# Patient Record
Sex: FEMALE | Race: WHITE | Employment: OTHER | URBAN - METROPOLITAN AREA
[De-identification: names, ages, dates, MRNs, and addresses within clinical notes are randomized per-mention and may not be internally consistent; named-entity substitution may affect disease eponyms.]

---

## 2019-11-18 ENCOUNTER — HOSPITAL ENCOUNTER (OUTPATIENT)
Dept: MRI IMAGING | Age: 59
Discharge: HOME | End: 2019-11-18
Payer: MEDICAID

## 2019-11-18 DIAGNOSIS — M17.9 OSTEOARTHRITIS OF KNEE, UNSPECIFIED: ICD-10-CM

## 2019-11-18 PROCEDURE — 73721 MRI JNT OF LWR EXTRE W/O DYE: CPT

## 2019-12-16 PROBLEM — N18.30 CKD (CHRONIC KIDNEY DISEASE), STAGE III (HCC): Status: ACTIVE | Noted: 2019-12-16

## 2019-12-16 PROBLEM — E03.9 HYPOTHYROIDISM: Status: ACTIVE | Noted: 2019-12-16

## 2019-12-16 PROBLEM — I10 HTN (HYPERTENSION): Status: ACTIVE | Noted: 2019-12-16

## 2019-12-16 PROBLEM — M19.90 OA (OSTEOARTHRITIS): Status: ACTIVE | Noted: 2019-12-16

## 2020-01-16 ENCOUNTER — HOSPITAL ENCOUNTER (OUTPATIENT)
Dept: WOMENS IMAGING | Age: 60
Discharge: HOME | End: 2020-01-16
Payer: MEDICAID

## 2020-01-16 PROCEDURE — 77063 BREAST TOMOSYNTHESIS BI: CPT

## 2020-03-09 ENCOUNTER — APPOINTMENT (OUTPATIENT)
Dept: CT IMAGING | Age: 60
End: 2020-03-09
Payer: MEDICAID

## 2020-03-09 ENCOUNTER — HOSPITAL ENCOUNTER (EMERGENCY)
Age: 60
Discharge: HOME | End: 2020-03-09
Attending: EMERGENCY MEDICINE
Payer: MEDICAID

## 2020-03-09 VITALS
OXYGEN SATURATION: 100 % | SYSTOLIC BLOOD PRESSURE: 127 MMHG | TEMPERATURE: 97.3 F | RESPIRATION RATE: 16 BRPM | DIASTOLIC BLOOD PRESSURE: 73 MMHG | HEART RATE: 73 BPM

## 2020-03-09 LAB
ALBUMIN SERPL-MCNC: 3.8 GM/DL (ref 3.4–5)
ALP BLD-CCNC: 61 IU/L (ref 40–129)
ALT SERPL-CCNC: 19 U/L (ref 10–40)
ANION GAP SERPL CALCULATED.3IONS-SCNC: 11 MMOL/L (ref 4–16)
AST SERPL-CCNC: 22 IU/L (ref 15–37)
BASOPHILS ABSOLUTE: 0 K/CU MM
BASOPHILS RELATIVE PERCENT: 0.3 % (ref 0–1)
BILIRUB SERPL-MCNC: 0.3 MG/DL (ref 0–1)
BUN BLDV-MCNC: 8 MG/DL (ref 6–23)
CALCIUM SERPL-MCNC: 8.5 MG/DL (ref 8.3–10.6)
CHLORIDE BLD-SCNC: 102 MMOL/L (ref 99–110)
CO2: 21 MMOL/L (ref 21–32)
CREAT SERPL-MCNC: 1 MG/DL (ref 0.6–1.1)
DIFFERENTIAL TYPE: ABNORMAL
EOSINOPHILS ABSOLUTE: 0.1 K/CU MM
EOSINOPHILS RELATIVE PERCENT: 1.4 % (ref 0–3)
GFR AFRICAN AMERICAN: >60 ML/MIN/1.73M2
GFR NON-AFRICAN AMERICAN: 57 ML/MIN/1.73M2
GLUCOSE BLD-MCNC: 84 MG/DL (ref 70–99)
HCT VFR BLD CALC: 37 % (ref 37–47)
HCT VFR BLD CALC: 42.2 % (ref 37–47)
HEMOGLOBIN: 11.9 GM/DL (ref 12.5–16)
HEMOGLOBIN: 12.7 GM/DL (ref 12.5–16)
IMMATURE NEUTROPHIL %: 0.4 % (ref 0–0.43)
INR BLD: 1.01 INDEX
LYMPHOCYTES ABSOLUTE: 2.4 K/CU MM
LYMPHOCYTES RELATIVE PERCENT: 30.2 % (ref 24–44)
MCH RBC QN AUTO: 29.8 PG (ref 27–31)
MCHC RBC AUTO-ENTMCNC: 32.2 % (ref 32–36)
MCV RBC AUTO: 92.7 FL (ref 78–100)
MONOCYTES ABSOLUTE: 0.8 K/CU MM
MONOCYTES RELATIVE PERCENT: 10 % (ref 0–4)
NUCLEATED RBC %: 0 %
PDW BLD-RTO: 13.2 % (ref 11.7–14.9)
PLATELET # BLD: 203 K/CU MM (ref 140–440)
PMV BLD AUTO: 9.7 FL (ref 7.5–11.1)
POTASSIUM SERPL-SCNC: 3.7 MMOL/L (ref 3.5–5.1)
PROTHROMBIN TIME: 12.2 SECONDS (ref 11.7–14.5)
RBC # BLD: 3.99 M/CU MM (ref 4.2–5.4)
SEGMENTED NEUTROPHILS ABSOLUTE COUNT: 4.6 K/CU MM
SEGMENTED NEUTROPHILS RELATIVE PERCENT: 57.7 % (ref 36–66)
SODIUM BLD-SCNC: 134 MMOL/L (ref 135–145)
TOTAL IMMATURE NEUTOROPHIL: 0.03 K/CU MM
TOTAL NUCLEATED RBC: 0 K/CU MM
TOTAL PROTEIN: 6.3 GM/DL (ref 6.4–8.2)
WBC # BLD: 7.9 K/CU MM (ref 4–10.5)

## 2020-03-09 PROCEDURE — 36415 COLL VENOUS BLD VENIPUNCTURE: CPT

## 2020-03-09 PROCEDURE — 85025 COMPLETE CBC W/AUTO DIFF WBC: CPT

## 2020-03-09 PROCEDURE — 70450 CT HEAD/BRAIN W/O DYE: CPT

## 2020-03-09 PROCEDURE — 93005 ELECTROCARDIOGRAM TRACING: CPT | Performed by: EMERGENCY MEDICINE

## 2020-03-09 PROCEDURE — 85018 HEMOGLOBIN: CPT

## 2020-03-09 PROCEDURE — 85610 PROTHROMBIN TIME: CPT

## 2020-03-09 PROCEDURE — 85014 HEMATOCRIT: CPT

## 2020-03-09 PROCEDURE — 80053 COMPREHEN METABOLIC PANEL: CPT

## 2020-03-09 PROCEDURE — 99284 EMERGENCY DEPT VISIT MOD MDM: CPT

## 2020-03-09 PROCEDURE — 93010 ELECTROCARDIOGRAM REPORT: CPT | Performed by: INTERNAL MEDICINE

## 2020-03-09 NOTE — ED PROVIDER NOTES
eMERGENCY dEPARTMENT eNCOUnter      PCP: LISA Gustafson NP    CHIEF COMPLAINT    Chief Complaint   Patient presents with    Vaginal Bleeding    Fatigue       HPI    Mo Galdamez is a 64 y.o. female who presents with vaginal bleeding. She states that intercourse last night and then at 3 AM with bleeding. She states the bleeding has been heavier than normal.  She has had clots. Reports generalized weakness, feeling lightheaded and fatigue. She denies focal weakness. She denies coagulants. She denies abdominal pain. REVIEW OF SYSTEMS    Constitutional:  Denies fever, chills. HENT:  Denies sore throat or ear pain   Cardiovascular:  Denies chest pain, palpitations   Respiratory:  Denies cough or shortness of breath    GI:  Denies abdominal pain, nausea, vomiting, or diarrhea  :  Denies any urinary symptoms, flank pain  Musculoskeletal:  Denies back pain, extremity pain  Skin:  Denies rash, color change  Neurologic:  Denies headache, focal weakness or sensory changes   Lymphatic:  Denies swollen glands, edema    All other review of systems are negative  See HPI and nursing notes for additional information     PAST MEDICAL AND SURGICAL HISTORY    Past Medical History:   Diagnosis Date    Arthritis     osteoarthritis    Degenerative disc disease     Fibromyalgia     Hyperlipidemia     Hypertension     Hypothyroidism 12/16/2019     Past Surgical History:   Procedure Laterality Date    BREAST BIOPSY  2000    BREAST SURGERY      biopsy    HEMORRHOID SURGERY      TUBAL LIGATION         CURRENT MEDICATIONS    Current Outpatient Rx   Medication Sig Dispense Refill    busPIRone (BUSPAR) 7.5 MG tablet Take 15 mg by mouth 3 times daily       levothyroxine (SYNTHROID) 50 MCG tablet Take 50 mcg by mouth Daily      gabapentin (NEURONTIN) 400 MG capsule Take 600 mg by mouth 3 times daily.        Cholecalciferol (VITAMIN D3) 5000 UNITS TABS Take by mouth      ropinirole (REQUIP) 0.5 MG tablet Take 0.5 mg by mouth once.  milnacipran HCl (SAVELLA) 25 MG TABS Take 25 mg by mouth 2 times daily.  trazodone (DESYREL) 50 MG tablet Take 50 mg by mouth 2 times daily.  enalapril (VASOTEC) 2.5 MG tablet Take 5 mg by mouth 2 times daily       lorazepam (ATIVAN) 1 MG tablet Take 1 mg by mouth 2 times daily.  simvastatin (ZOCOR) 20 MG tablet Take 20 mg by mouth nightly.  sertraline (ZOLOFT) 50 MG tablet Take 100 mg by mouth daily       tizanidine (ZANAFLEX) 2 MG capsule Take 4 mg by mouth 3 times daily          ALLERGIES    No Known Allergies    SOCIAL AND FAMILY HISTORY    Social History     Socioeconomic History    Marital status: Single     Spouse name: None    Number of children: None    Years of education: None    Highest education level: None   Occupational History    None   Social Needs    Financial resource strain: None    Food insecurity     Worry: None     Inability: None    Transportation needs     Medical: None     Non-medical: None   Tobacco Use    Smoking status: Never Smoker    Smokeless tobacco: Never Used   Substance and Sexual Activity    Alcohol use:  Yes    Drug use: No    Sexual activity: None   Lifestyle    Physical activity     Days per week: None     Minutes per session: None    Stress: None   Relationships    Social connections     Talks on phone: None     Gets together: None     Attends Anglican service: None     Active member of club or organization: None     Attends meetings of clubs or organizations: None     Relationship status: None    Intimate partner violence     Fear of current or ex partner: None     Emotionally abused: None     Physically abused: None     Forced sexual activity: None   Other Topics Concern    None   Social History Narrative    None     Family History   Problem Relation Age of Onset    Heart Disease Mother     High Blood Pressure Mother     Diabetes Father     Heart Disease Brother     Diabetes Brother    [facility] High Blood Pressure Brother          PHYSICAL EXAM    VITAL SIGNS: There were no vitals taken for this visit. Constitutional:  Well developed, No acute distress. HENT:  Normocephalic, Atraumatic, PERRL. EOMI. Sclera clear. Conjunctiva normal.  Neck: supple without ridigity  Cardiovascular:  Regular rate and rhythm, No murmurs  Respiratory:  Nonlabored breathing. Normal breath sounds, No wheezing  Abdomen: Soft, Non tender, bowel sounds present. : Pelvic exam performed with chaperone, nurse Jane Nielsen present. 2 lacerations noted 1 at the 12 o'clock position, another at the 6 o'clock position. The one at the 12 o'clock position is approximately 1-1/2 cm, superficial, not gaping, but is actively slightly bleeding. The other one is punctate, less than a half a centimeter. Appears more abrased than gapping laceraations. Musculoskeletal: No edema, No tenderness  Integument:  Warm, Dry, no rash  Neurologic:  Alert & oriented. Vision is intact. Pupils are equal, round and reactive to light bilaterally, EOM are intact, face is symmetrical, tongue is midline. Motor function and sensation are grossly intact. There is no pronator drift. Finger to nose is normal. Lower extremity reflexes are +2. No trunchal ataxia. Speech is normal.   Psychiatric:  Affect normal, Mood normal.       Labs:  Labs Reviewed   CBC WITH AUTO DIFFERENTIAL - Abnormal; Notable for the following components:       Result Value    RBC 3.99 (*)     Hemoglobin 11.9 (*)     Monocytes % 10.0 (*)     All other components within normal limits   COMPREHENSIVE METABOLIC PANEL - Abnormal; Notable for the following components:    Sodium 134 (*)     Total Protein 6.3 (*)     GFR Non- 57 (*)     All other components within normal limits   PROTIME-INR   HEMOGLOBIN AND HEMATOCRIT, BLOOD         EKG    This EKG was interpreted by me. Rate is 64, rhythm is sinus. NC and QT intervals are within normal limits.  There is no ST segment or T wave changes. This EKG was compared to previous EKG from date 8/17/18. Peers similar to previous EKG. RADIOLOGY    Ct Head Wo Contrast    Result Date: 3/9/2020  EXAMINATION: CT OF THE HEAD WITHOUT CONTRAST  3/9/2020 10:17 am TECHNIQUE: CT of the head was performed without the administration of intravenous contrast. Dose modulation, iterative reconstruction, and/or weight based adjustment of the mA/kV was utilized to reduce the radiation dose to as low as reasonably achievable. COMPARISON: None. HISTORY: ORDERING SYSTEM PROVIDED HISTORY: weakness TECHNOLOGIST PROVIDED HISTORY: Reason for exam:->weakness Has a \"code stroke\" or \"stroke alert\" been called? ->No Reason for Exam: weakness Acuity: Acute Type of Exam: Initial FINDINGS: BRAIN/VENTRICLES: The cerebral and cerebellar parenchyma demonstrate normal volume without areas of hemorrhage, mass, or midline shift. No abnormal extra-axial fluid collections. The ventricles are normal in size. Gray-white differentiation is maintained without evidence of an acute infarct. ORBITS: The visualized portion of the orbits demonstrate no acute abnormality. SINUSES: The visualized paranasal sinuses and mastoid air cells demonstrate no acute abnormality. SOFT TISSUES/SKULL:  No acute abnormality of the visualized skull or soft tissues. No acute intracranial abnormality. ED COURSE & MEDICAL DECISION MAKING       Vital signs and nursing notes reviewed during ED course. All pertinent Lab data and radiographic results reviewed with patient at bedside. The patient and/or the family were informed of the results of any tests/labs/imaging, the treatment plan, and time was allotted to answer questions. This is a 72-year-old female who presented with complaints of vaginal bleeding. Medics were concerned about abnormal speech and possibly facial droop.   On her arrival I did not notice any facial droop, there were no focal findings on exam.  She had no speech difficulties. Her only complaint was vaginal bleeding and feeling lightheaded. I have low suspicion for CVA without any focal symptoms. I did call off the stroke alert as I did not feel this was likely to be stroke. Exam was performed which did show to vaginal lacerations. These were not deep enough to need suturing, and leading was subsiding on its own. Labs did show hemoglobin to be 11.9. After several hours of observation and bleeding subsiding on its own hemoglobin was 12.7. She was given IV fluids initially. Given that the bleeding has improved, hemoglobin is stable, vital signs reassuring I do feel that discharge is reasonable but with outpatient follow-up with her GYN. She is instructed on pelvic rest, bleeding precautions. She is instructed return to the emergency department with any new or worsening signs or symptoms. She voiced understanding of the discharge directions and the return precautions.       Clinical  IMPRESSION    Vaginal bleeding, vaginal laceration          (Please note the MDM and HPI sections of this note were completed with a voice recognition program.  Efforts were made to edit the dictations but occasionally words are mis-transcribed.)      Silvia Emerson DO  03/09/20 8941

## 2020-03-09 NOTE — ED NOTES
Assessed external vaginal area for bleeding there is bright red blood noted on the inner thighs of the patient legs however, at this time I did not see excesive bleeding.       Miley Wyatt RN  03/09/20 1445

## 2020-03-13 LAB
EKG ATRIAL RATE: 64 BPM
EKG DIAGNOSIS: NORMAL
EKG P AXIS: -3 DEGREES
EKG P-R INTERVAL: 148 MS
EKG Q-T INTERVAL: 468 MS
EKG QRS DURATION: 88 MS
EKG QTC CALCULATION (BAZETT): 482 MS
EKG R AXIS: 61 DEGREES
EKG T AXIS: 31 DEGREES
EKG VENTRICULAR RATE: 64 BPM

## 2021-01-03 ENCOUNTER — APPOINTMENT (OUTPATIENT)
Dept: GENERAL RADIOLOGY | Age: 61
End: 2021-01-03
Payer: MEDICAID

## 2021-01-03 ENCOUNTER — HOSPITAL ENCOUNTER (EMERGENCY)
Age: 61
Discharge: HOME | End: 2021-01-03
Payer: MEDICAID

## 2021-01-03 VITALS
OXYGEN SATURATION: 98 % | HEIGHT: 64 IN | BODY MASS INDEX: 36.54 KG/M2 | RESPIRATION RATE: 18 BRPM | DIASTOLIC BLOOD PRESSURE: 84 MMHG | TEMPERATURE: 98.5 F | HEART RATE: 71 BPM | WEIGHT: 214 LBS | SYSTOLIC BLOOD PRESSURE: 113 MMHG

## 2021-01-03 DIAGNOSIS — M25.562 ACUTE PAIN OF LEFT KNEE: Primary | ICD-10-CM

## 2021-01-03 DIAGNOSIS — M54.50 ACUTE LOW BACK PAIN, UNSPECIFIED BACK PAIN LATERALITY, UNSPECIFIED WHETHER SCIATICA PRESENT: ICD-10-CM

## 2021-01-03 PROCEDURE — 99285 EMERGENCY DEPT VISIT HI MDM: CPT

## 2021-01-03 PROCEDURE — 72100 X-RAY EXAM L-S SPINE 2/3 VWS: CPT

## 2021-01-03 PROCEDURE — 72170 X-RAY EXAM OF PELVIS: CPT

## 2021-01-03 PROCEDURE — 73564 X-RAY EXAM KNEE 4 OR MORE: CPT

## 2021-01-03 PROCEDURE — 96372 THER/PROPH/DIAG INJ SC/IM: CPT

## 2021-01-03 PROCEDURE — 6370000000 HC RX 637 (ALT 250 FOR IP): Performed by: PHYSICIAN ASSISTANT

## 2021-01-03 PROCEDURE — 6360000002 HC RX W HCPCS: Performed by: PHYSICIAN ASSISTANT

## 2021-01-03 RX ORDER — KETOROLAC TROMETHAMINE 30 MG/ML
30 INJECTION, SOLUTION INTRAMUSCULAR; INTRAVENOUS ONCE
Status: COMPLETED | OUTPATIENT
Start: 2021-01-03 | End: 2021-01-03

## 2021-01-03 RX ORDER — HYDROCODONE BITARTRATE AND ACETAMINOPHEN 5; 325 MG/1; MG/1
1 TABLET ORAL ONCE
Status: COMPLETED | OUTPATIENT
Start: 2021-01-03 | End: 2021-01-03

## 2021-01-03 RX ORDER — HYDROCODONE BITARTRATE AND ACETAMINOPHEN 5; 325 MG/1; MG/1
1 TABLET ORAL EVERY 4 HOURS PRN
Qty: 8 TABLET | Refills: 0 | Status: SHIPPED | OUTPATIENT
Start: 2021-01-03 | End: 2021-01-06

## 2021-01-03 RX ORDER — IBUPROFEN 600 MG/1
600 TABLET ORAL EVERY 6 HOURS PRN
Qty: 20 TABLET | Refills: 0 | Status: SHIPPED | OUTPATIENT
Start: 2021-01-03 | End: 2021-05-24

## 2021-01-03 RX ADMIN — HYDROCODONE BITARTRATE AND ACETAMINOPHEN 1 TABLET: 5; 325 TABLET ORAL at 15:04

## 2021-01-03 RX ADMIN — KETOROLAC TROMETHAMINE 30 MG: 30 INJECTION, SOLUTION INTRAMUSCULAR; INTRAVENOUS at 15:05

## 2021-01-03 ASSESSMENT — PAIN DESCRIPTION - ORIENTATION: ORIENTATION: LEFT

## 2021-01-03 ASSESSMENT — PAIN SCALES - GENERAL: PAINLEVEL_OUTOF10: 10

## 2021-01-03 NOTE — ED PROVIDER NOTES
[address] [facility] COMPLAINT    Chief Complaint   Patient presents with    Fall    Knee Pain     left         This patient was not evaluated by the attending physician. I have independently evaluated this patient. HPI    Cielo Aaron is a 64 y.o. female who presents with low back pain, left knee pain. Onset 3 days ago. Context is patient slipped on ice causing her to fall onto her buttock. She notes an exacerbation of her chronic low back pain as well as left knee pain. The pain is generalized to the anterior aspect and does not radiate. Pain is worse with ambulation. Patient states she has chronic knee pain for which she is scheduled for arthroscopic exploratory surgery in the near future. Pain is achy and does not radiate. No other lower extremity injuries. No upper extremity injuries. Back pain is generalized to the low back, worse with certain movements of the torso. No numbness, tingling, weakness. No new nature of symptoms compared to previous chronic back pain. Patient does note she had struck the back of her head. She initially had scalp pain localized to the area of injury. She denies loss of consciousness or neck pain. She denies vomiting, headache, visual changes, confusion. Denies blood or clear fluid from ears, nose, mouth. Pt denies EtOH or illicit drug use. REVIEW OF SYSTEMS    Constitutional:  Denies fever. Neurologic:    See HPI. Denies confusion or memory loss. Denies light-headedness, dizziness. No extremity  sensory changes, or weakness. Eyes:  Denies diplopia, blurred vision, or loss visual field. Denies discharge . Cardiac: No Chest Pain, palpitations, or Chest Injury  Respiratory:  Denies cough, wheezes, shortness of breath, respiratory discomfort   GI: No Abdominal pain or Abdominal Injury  Musculoskeletal:    See HPI. Skin: Abrasions over anterior left knee, otherwise skin intact.   Denies rash   Lymphatic: Denies swollen glands     All other review of systems are negative  See HPI and nursing notes for additional information     PAST MEDICAL AND SURGICAL HISTORY    Past Medical History:   Diagnosis Date    Arthritis     osteoarthritis    Degenerative disc disease     Fibromyalgia     Hyperlipidemia     Hypertension     Hypothyroidism 12/16/2019     Past Surgical History:   Procedure Laterality Date    BREAST BIOPSY  2000    BREAST SURGERY      biopsy    HEMORRHOID SURGERY      HYSTERECTOMY      TUBAL LIGATION         CURRENT MEDICATIONS    Current Outpatient Rx   Medication Sig Dispense Refill    HYDROcodone-acetaminophen (NORCO) 5-325 MG per tablet Take 1 tablet by mouth every 4 hours as needed for Pain for up to 3 days. 8 tablet 0    ibuprofen (ADVIL;MOTRIN) 600 MG tablet Take 1 tablet by mouth every 6 hours as needed for Pain 20 tablet 0    cetirizine (ZYRTEC) 10 MG tablet Take 10 mg by mouth daily      busPIRone (BUSPAR) 7.5 MG tablet Take 30 mg by mouth 2 times daily       levothyroxine (SYNTHROID) 50 MCG tablet Take 50 mcg by mouth Daily      gabapentin (NEURONTIN) 400 MG capsule Take 600 mg by mouth 2 times daily.  Cholecalciferol (VITAMIN D3) 5000 UNITS TABS Take by mouth      ropinirole (REQUIP) 0.5 MG tablet Take 0.5 mg by mouth once.  trazodone (DESYREL) 50 MG tablet Take 100 mg by mouth nightly       enalapril (VASOTEC) 2.5 MG tablet Take 5 mg by mouth 2 times daily       simvastatin (ZOCOR) 20 MG tablet Take 20 mg by mouth nightly.         sertraline (ZOLOFT) 50 MG tablet Take 150 mg by mouth daily       tizanidine (ZANAFLEX) 2 MG capsule Take 4 mg by mouth 2 times daily          ALLERGIES    No Known Allergies    SOCIAL AND FAMILY HISTORY    Social History     Socioeconomic History    Marital status: Single     Spouse name: Not on file    Number of children: Not on file    Years of education: Not on file    Highest education level: Not on file   Occupational History  Not on file   Social Needs    Financial resource strain: Not on file    Food insecurity     Worry: Not on file     Inability: Not on file    Transportation needs     Medical: Not on file     Non-medical: Not on file   Tobacco Use    Smoking status: Never Smoker    Smokeless tobacco: Never Used   Substance and Sexual Activity    Alcohol use: Yes    Drug use: No    Sexual activity: Yes     Partners: Male   Lifestyle    Physical activity     Days per week: Not on file     Minutes per session: Not on file    Stress: Not on file   Relationships    Social connections     Talks on phone: Not on file     Gets together: Not on file     Attends Yarsani service: Not on file     Active member of club or organization: Not on file     Attends meetings of clubs or organizations: Not on file     Relationship status: Not on file    Intimate partner violence     Fear of current or ex partner: Not on file     Emotionally abused: Not on file     Physically abused: Not on file     Forced sexual activity: Not on file   Other Topics Concern    Not on file   Social History Narrative    Not on file     Family History   Problem Relation Age of Onset    Heart Disease Mother     High Blood Pressure Mother     Diabetes Father     Heart Disease Brother     Diabetes Brother     High Blood Pressure Brother          PHYSICAL EXAM    VITAL SIGNS: /84   Pulse 71   Temp 98.5 °F (36.9 °C) (Oral)   Resp 18   Ht 5' 4\" (1.626 m)   Wt 214 lb (97.1 kg)   SpO2 98%   BMI 36.73 kg/m²      Constitutional:  Well developed, well nourished, no acute distress. Scalp:  No swelling, discoloration. Skin intact. No focal palpable defect or tenderness. Neck / back:  No JVD. No swelling or discoloration on inspection. No posterior midline neck tenderness. There is mild generalized right superior trapezius tenderness without redness, warmth, palpable defect. Full range of motion without pain.    No swelling, discoloration, or tenderness/palpable defect of remaining back exam.    HENT:   - PERRL. EOMI. No obvious eyeball trauma, hyphema, hemorrhage, or conjunctival injection. Eyelids intact without obvious trauma. - External auditory canals and TMs clear  - Oral cavity without injury. Dentition intact without obvious injury. Oropharynx clear. No trismus    -Nasal passages clear without blood, clear fluid, mass, septal mass/hematoma. Nose is without obvious deformity, tenderness, or palpable defect. -  Palpation of the remainder of facial bones including orbits, maxilla and mandible reveals no focal tenderness or palpable defect, crepitus. No midface instability. Able to fully open and close jaw without pain or TMJ tenderness.      - No Sanchez sign, no raccoon sign. Cardiovascular:    Reg rate, no murmurs. Inspection of chest wall reveals no discoloration or swelling. There is no focal tenderness or palpable defect. Respiratory:   Nonlabored breathing. Lungs Clear, no retractions   Abdomen:    No discoloration or swelling. Soft, no rigidity, guarding, distention. No tenderness or palpable defect. Musculoskeletal:    Left knee exam: There are several small abrasions over the anterior lateral knee. No obvious joint effusion or joint erythema or warmth. There is generalized tenderness to the anterior knee without palpable defect. Patient has full active range of motion intact although slow in terminal flexion. No valgus, varus, drawer laxity. Able to fully extend against gravity-extensor mechanism intact. Distal pulses, sensation, motor intact. Full range of motion of remaining upper and lower extremities without obvious deficit , pain, tenderness to palpation. Back exam: No swelling or discoloration. There is generalized bilateral paralumbar tenderness without redness or warmth or palpable defect. Lower extremity motor, sensation, deep tendon reflexes intact.     Integument:    Abrasions noted above, otherwise intact      Neurologic:    - Alert & oriented person, place, time, and situation, no speech difficulties or slurring.  - No obvious gross motor deficits  - Cranial nerves 2-12 grossly intact  - Sensation intact to light touch  - Strength 5/5 in upper and lower extremities bilaterally  - Normal finger to nose test bilaterally  - Rapid alternating movements intact  - Normal heel-shin bilaterally  - No pronator drift. - Romberg negative. - Light touch sensation intact throughout. - Upper and lower extremity DTRs 2+ bilaterally. -Gait with mild limp favoring left knee, otherwise steady without coordination deficit. Psych:  Cooperative, no abnormal behavior or hallucinations        Imaging:  XR KNEE LEFT (MIN 4 VIEWS)   Final Result   Negative left knee. XR LUMBAR SPINE (2-3 VIEWS)   Final Result   Multiple mild compression deformities of the lumbar spine appear chronic. If   there is concern for acute fracture, suggest MRI or bone scan for further   evaluation. XR PELVIS (1-2 VIEWS)   Final Result   No acute osseous abnormality of the pelvis. ED COURSE & MEDICAL DECISION MAKING        Patient presents as above history and exam is consistent with a chemical fall with subsequent low back pain flare and contusions, abrasion to left knee. No obvious osseous abnormalities on imaging today. There is note of chronic appearing compression deformities without acute appearance-I reviewed this with patient at bedside. We discussed the possibility of acute compression fracture today and patient will be given pain medication and recommend follow with PCP regarding back pain. Patient is neurologically intact I feel safe for discharge home. Plan for discharge with double Ace wrap on knee and crutches for weightbearing assistance and follow with orthopedist for continued evaluation of ongoing knee pain which is flared from recent injury. .    Pain med Rx provided, double Ace wrap and crutches provided and patient agrees to call orthopedist tomorrow for recheck appointment within the next several days. I discussed Return to emergency Department precautions with patient which include worsening pain or swelling, vision changes, focal neurological symptoms (discussed), or any new symptoms. Clinical  IMPRESSION    1. Acute pain of left knee    2. Acute low back pain, unspecified back pain laterality, unspecified whether sciatica present              Comment: Please note this report has been produced using speech recognition software and may contain errors related to that system including errors in grammar, punctuation, and spelling, as well as words and phrases that may be inappropriate. If there are any questions or concerns please feel free to contact the dictating provider for clarification.                Kp Johnson [place]  01/03/21 6786

## 2021-01-03 NOTE — ED NOTES
Discussed discharge instructions with patient. All questions answered. Patient verbalized understanding. Ace wrap applied to left knee. Patient refused crutches because she thinks she will not be able to use them. She states she has a walker at home and will use that instead. Patient taken out by wheelchair.  will be taking patient home.      Marvin Dowling RN  01/03/21 [id number]

## 2021-01-03 NOTE — ED TRIAGE NOTES
Pt presents to ED by EMS from home for pain in the back and left knee that started on the 31st following a fall. Pt states she did hit her head but denies LOC. Pt is alert and oriented x4. Pt states she has been able to ambulate independently but rates pain 10/10.

## 2021-01-03 NOTE — ED NOTES
Bed: ED-33  Expected date:   Expected time:   Means of arrival:   Comments:  Nehal Mehta  01/03/21 1153

## 2022-03-02 ENCOUNTER — HOSPITAL ENCOUNTER (EMERGENCY)
Age: 62
Discharge: HOME | End: 2022-03-02
Attending: EMERGENCY MEDICINE
Payer: MEDICAID

## 2022-03-02 ENCOUNTER — APPOINTMENT (OUTPATIENT)
Dept: CT IMAGING | Age: 62
End: 2022-03-02
Payer: MEDICAID

## 2022-03-02 VITALS
HEIGHT: 64 IN | SYSTOLIC BLOOD PRESSURE: 137 MMHG | OXYGEN SATURATION: 99 % | TEMPERATURE: 97.9 F | DIASTOLIC BLOOD PRESSURE: 68 MMHG | WEIGHT: 205 LBS | BODY MASS INDEX: 35 KG/M2 | HEART RATE: 87 BPM | RESPIRATION RATE: 16 BRPM

## 2022-03-02 DIAGNOSIS — R51.9 ACUTE NONINTRACTABLE HEADACHE, UNSPECIFIED HEADACHE TYPE: Primary | ICD-10-CM

## 2022-03-02 DIAGNOSIS — R03.0 ELEVATED BLOOD PRESSURE READING: ICD-10-CM

## 2022-03-02 LAB
ALBUMIN SERPL-MCNC: 3.5 GM/DL (ref 3.4–5)
ALP BLD-CCNC: 64 IU/L (ref 40–129)
ALT SERPL-CCNC: 18 U/L (ref 10–40)
ANION GAP SERPL CALCULATED.3IONS-SCNC: 13 MMOL/L (ref 4–16)
APTT: 27.5 SECONDS (ref 25.1–37.1)
AST SERPL-CCNC: 25 IU/L (ref 15–37)
BASOPHILS ABSOLUTE: 0 K/CU MM
BASOPHILS RELATIVE PERCENT: 0.4 % (ref 0–1)
BILIRUB SERPL-MCNC: 0.4 MG/DL (ref 0–1)
BUN BLDV-MCNC: 9 MG/DL (ref 6–23)
CALCIUM SERPL-MCNC: 9 MG/DL (ref 8.3–10.6)
CHLORIDE BLD-SCNC: 104 MMOL/L (ref 99–110)
CO2: 19 MMOL/L (ref 21–32)
CREAT SERPL-MCNC: 1 MG/DL (ref 0.6–1.1)
DIFFERENTIAL TYPE: ABNORMAL
EOSINOPHILS ABSOLUTE: 0.2 K/CU MM
EOSINOPHILS RELATIVE PERCENT: 4.5 % (ref 0–3)
GFR AFRICAN AMERICAN: >60 ML/MIN/1.73M2
GFR NON-AFRICAN AMERICAN: 57 ML/MIN/1.73M2
GLUCOSE BLD-MCNC: 92 MG/DL (ref 70–99)
GLUCOSE, CSF: 47 MG/DL (ref 40–75)
HCT VFR BLD CALC: 44.2 % (ref 37–47)
HEMOGLOBIN: 13.4 GM/DL (ref 12.5–16)
IMMATURE NEUTROPHIL %: 0.2 % (ref 0–0.43)
INR BLD: 0.92 INDEX
LYMPHOCYTES ABSOLUTE: 2.4 K/CU MM
LYMPHOCYTES RELATIVE PERCENT: 46.5 % (ref 24–44)
LYMPHS CSF: 0 %
LYMPHS CSF: 0 %
MCH RBC QN AUTO: 29.7 PG (ref 27–31)
MCHC RBC AUTO-ENTMCNC: 30.3 % (ref 32–36)
MCV RBC AUTO: 98 FL (ref 78–100)
MONOCYTES ABSOLUTE: 0.4 K/CU MM
MONOCYTES RELATIVE PERCENT: 8.7 % (ref 0–4)
NUCLEATED RBC %: 0 %
OTHER CELLS CSF: ABNORMAL CU MM
OTHER CELLS CSF: NORMAL CU MM
OTHER CELLS, CSF: 0 %
OTHER CELLS, CSF: 0 %
PDW BLD-RTO: 12.3 % (ref 11.7–14.9)
PLATELET # BLD: 179 K/CU MM (ref 140–440)
PMV BLD AUTO: 10.1 FL (ref 7.5–11.1)
POTASSIUM SERPL-SCNC: 3.8 MMOL/L (ref 3.5–5.1)
PROTEIN CSF: 41 MG/DL (ref 15–45)
PROTHROMBIN TIME: 11.8 SECONDS (ref 11.7–14.5)
RBC # BLD: 4.51 M/CU MM (ref 4.2–5.4)
RBC CSF: 0 CU MM
RBC CSF: 10 CU MM
SEGMENTED NEUTROPHILS ABSOLUTE COUNT: 2 K/CU MM
SEGMENTED NEUTROPHILS RELATIVE PERCENT: 39.7 % (ref 36–66)
SEGS, CSF: 0 %
SEGS, CSF: 0 %
SODIUM BLD-SCNC: 136 MMOL/L (ref 135–145)
TOTAL IMMATURE NEUTOROPHIL: 0.01 K/CU MM
TOTAL NUCLEATED RBC: 0 K/CU MM
TOTAL PROTEIN: 6.5 GM/DL (ref 6.4–8.2)
TUBE #: ABNORMAL
TUBE #: NORMAL
WBC # BLD: 5.1 K/CU MM (ref 4–10.5)
WBC CSF: 0 CU MM (ref 0–5)
WBC CSF: 2 CU MM (ref 0–5)

## 2022-03-02 PROCEDURE — 6360000004 HC RX CONTRAST MEDICATION: Performed by: EMERGENCY MEDICINE

## 2022-03-02 PROCEDURE — 2500000003 HC RX 250 WO HCPCS: Performed by: EMERGENCY MEDICINE

## 2022-03-02 PROCEDURE — 6360000002 HC RX W HCPCS: Performed by: EMERGENCY MEDICINE

## 2022-03-02 PROCEDURE — 85610 PROTHROMBIN TIME: CPT

## 2022-03-02 PROCEDURE — 82945 GLUCOSE OTHER FLUID: CPT

## 2022-03-02 PROCEDURE — 87070 CULTURE OTHR SPECIMN AEROBIC: CPT

## 2022-03-02 PROCEDURE — 96372 THER/PROPH/DIAG INJ SC/IM: CPT

## 2022-03-02 PROCEDURE — 85730 THROMBOPLASTIN TIME PARTIAL: CPT

## 2022-03-02 PROCEDURE — 80053 COMPREHEN METABOLIC PANEL: CPT

## 2022-03-02 PROCEDURE — 62270 DX LMBR SPI PNXR: CPT

## 2022-03-02 PROCEDURE — 96375 TX/PRO/DX INJ NEW DRUG ADDON: CPT

## 2022-03-02 PROCEDURE — 84157 ASSAY OF PROTEIN OTHER: CPT

## 2022-03-02 PROCEDURE — 93005 ELECTROCARDIOGRAM TRACING: CPT | Performed by: EMERGENCY MEDICINE

## 2022-03-02 PROCEDURE — 85025 COMPLETE CBC W/AUTO DIFF WBC: CPT

## 2022-03-02 PROCEDURE — 89051 BODY FLUID CELL COUNT: CPT

## 2022-03-02 PROCEDURE — 96374 THER/PROPH/DIAG INJ IV PUSH: CPT

## 2022-03-02 PROCEDURE — 99285 EMERGENCY DEPT VISIT HI MDM: CPT

## 2022-03-02 PROCEDURE — 6370000000 HC RX 637 (ALT 250 FOR IP): Performed by: EMERGENCY MEDICINE

## 2022-03-02 PROCEDURE — 87205 SMEAR GRAM STAIN: CPT

## 2022-03-02 PROCEDURE — 70498 CT ANGIOGRAPHY NECK: CPT

## 2022-03-02 PROCEDURE — 70450 CT HEAD/BRAIN W/O DYE: CPT

## 2022-03-02 RX ORDER — HALOPERIDOL 5 MG/ML
5 INJECTION INTRAMUSCULAR ONCE
Status: COMPLETED | OUTPATIENT
Start: 2022-03-02 | End: 2022-03-02

## 2022-03-02 RX ORDER — DIPHENHYDRAMINE HYDROCHLORIDE 50 MG/ML
50 INJECTION INTRAMUSCULAR; INTRAVENOUS ONCE
Status: COMPLETED | OUTPATIENT
Start: 2022-03-02 | End: 2022-03-02

## 2022-03-02 RX ORDER — METOCLOPRAMIDE HYDROCHLORIDE 5 MG/ML
10 INJECTION INTRAMUSCULAR; INTRAVENOUS ONCE
Status: COMPLETED | OUTPATIENT
Start: 2022-03-02 | End: 2022-03-02

## 2022-03-02 RX ORDER — ACETAMINOPHEN 500 MG
1000 TABLET ORAL ONCE
Status: COMPLETED | OUTPATIENT
Start: 2022-03-02 | End: 2022-03-02

## 2022-03-02 RX ORDER — SODIUM CHLORIDE 0.9 % (FLUSH) 0.9 %
10 SYRINGE (ML) INJECTION PRN
Status: DISCONTINUED | OUTPATIENT
Start: 2022-03-02 | End: 2022-03-02 | Stop reason: HOSPADM

## 2022-03-02 RX ORDER — LIDOCAINE HYDROCHLORIDE AND EPINEPHRINE 10; 10 MG/ML; UG/ML
20 INJECTION, SOLUTION INFILTRATION; PERINEURAL ONCE
Status: COMPLETED | OUTPATIENT
Start: 2022-03-02 | End: 2022-03-02

## 2022-03-02 RX ORDER — INDOMETHACIN 50 MG/1
50 CAPSULE ORAL 2 TIMES DAILY PRN
Qty: 12 CAPSULE | Refills: 0 | Status: SHIPPED | OUTPATIENT
Start: 2022-03-02

## 2022-03-02 RX ORDER — ONDANSETRON 4 MG/1
4 TABLET, ORALLY DISINTEGRATING ORAL 3 TIMES DAILY PRN
Qty: 21 TABLET | Refills: 0 | Status: SHIPPED | OUTPATIENT
Start: 2022-03-02

## 2022-03-02 RX ADMIN — HALOPERIDOL LACTATE 5 MG: 5 INJECTION, SOLUTION INTRAMUSCULAR at 16:13

## 2022-03-02 RX ADMIN — IOPAMIDOL 80 ML: 755 INJECTION, SOLUTION INTRAVENOUS at 14:03

## 2022-03-02 RX ADMIN — METOCLOPRAMIDE 10 MG: 5 INJECTION, SOLUTION INTRAMUSCULAR; INTRAVENOUS at 14:52

## 2022-03-02 RX ADMIN — ACETAMINOPHEN 1000 MG: 500 TABLET ORAL at 14:47

## 2022-03-02 RX ADMIN — DIPHENHYDRAMINE HYDROCHLORIDE 50 MG: 50 INJECTION, SOLUTION INTRAMUSCULAR; INTRAVENOUS at 14:49

## 2022-03-02 RX ADMIN — LIDOCAINE HYDROCHLORIDE AND EPINEPHRINE 20 ML: 10; 10 INJECTION, SOLUTION INFILTRATION; PERINEURAL at 14:55

## 2022-03-02 ASSESSMENT — PAIN SCALES - GENERAL
PAINLEVEL_OUTOF10: 3
PAINLEVEL_OUTOF10: 10
PAINLEVEL_OUTOF10: 10
PAINLEVEL_OUTOF10: 2
PAINLEVEL_OUTOF10: 10
PAINLEVEL_OUTOF10: 7
PAINLEVEL_OUTOF10: 8

## 2022-03-02 ASSESSMENT — PAIN DESCRIPTION - PAIN TYPE
TYPE: ACUTE PAIN

## 2022-03-02 ASSESSMENT — PAIN - FUNCTIONAL ASSESSMENT
PAIN_FUNCTIONAL_ASSESSMENT: 0-10

## 2022-03-02 ASSESSMENT — PAIN DESCRIPTION - FREQUENCY: FREQUENCY: CONTINUOUS

## 2022-03-02 ASSESSMENT — PAIN DESCRIPTION - ORIENTATION
ORIENTATION: LEFT
ORIENTATION: POSTERIOR

## 2022-03-02 ASSESSMENT — PAIN DESCRIPTION - LOCATION
LOCATION: HEAD

## 2022-03-02 ASSESSMENT — PAIN SCALES - WONG BAKER: WONGBAKER_NUMERICALRESPONSE: 0

## 2022-03-02 ASSESSMENT — PAIN DESCRIPTION - DESCRIPTORS: DESCRIPTORS: THROBBING

## 2022-03-02 NOTE — ED NOTES
Procedure complete at this time.  Patient states headache feels \"a little better\"        Gemma Barrera RN  03/02/22 3709

## 2022-03-02 NOTE — ED PROVIDER NOTES
Triage Chief Complaint:   Headache (pulsating, excruciating pain in back of head that started 30 minutes ago)    [place]:  Yvon Herrera is a 63 y.o. female that presents with a headache. Patient was in baseline state of health until 30 minutes prior to arrival when she was engaging in some foreplay with her significant other and developed a headache. Headache is posterior head, bilateral, radiating to the front of the head, constant since onset and currently severe. No radiation to the neck. No head trauma. No numbness or weakness. No vision changes. No difficulty with speech or understanding. Patient has never had a headache like this before. Patient was feeling well before this. Patient does detail a remote history of \"encephalitis\" and is established with a neurosurgeon per her report for this. Patient has history of aneurysms. No prior head bleeds. No recent illnesses or fevers.     ROS:  General:  No fevers, no chills, no weakness  Eyes:  No recent vison changes, no discharge  ENT:  No sore throat, no nasal congestion, no hearing changes  Cardiovascular:  No chest pain, no palpitations  Respiratory:  No shortness of breath, no cough, no wheezing  Gastrointestinal:  No pain, no nausea, no vomiting, no diarrhea  Musculoskeletal:  No muscle pain, no joint pain  Skin:  No rash, no pruritis, no easy bruising  Neurologic:  No speech problems, + headache, no extremity numbness, no extremity tingling, no extremity weakness  Psychiatric:  No anxiety  Genitourinary:  No dysuria, no hematuria  Endocrine:  No unexpected weight gain, no unexpected weight loss  Extremities:  no edema, no pain    Past Medical History:   Diagnosis Date    Arthritis     osteoarthritis    Degenerative disc disease     Fibromyalgia     Hyperlipidemia     Hypertension     Hypothyroidism 12/16/2019     Past Surgical History:   Procedure Laterality Date    BREAST BIOPSY  2000    BREAST SURGERY      biopsy    HEMORRHOID SURGERY  HYSTERECTOMY      TUBAL LIGATION       Family History   Problem Relation Age of Onset    Heart Disease Mother     High Blood Pressure Mother     Diabetes Father     Heart Disease Brother     Diabetes Brother     High Blood Pressure Brother      Social History     Socioeconomic History    Marital status: Single     Spouse name: Not on file    Number of children: Not on file    Years of education: Not on file    Highest education level: Not on file   Occupational History    Not on file   Tobacco Use    Smoking status: Never Smoker    Smokeless tobacco: Never Used   Substance and Sexual Activity    Alcohol use: Yes    Drug use: No    Sexual activity: Yes     Partners: Male   Other Topics Concern    Not on file   Social History Narrative    Not on file     Social Determinants of Health     Financial Resource Strain:     Difficulty of Paying Living Expenses: Not on file   Food Insecurity:     Worried About Running Out of Food in the Last Year: Not on file    [place] of Food in the Last Year: Not on file   Transportation Needs:     Lack of Transportation (Medical): Not on file    Lack of Transportation (Non-Medical):  Not on file   Physical Activity:     Days of Exercise per Week: Not on file    Minutes of Exercise per Session: Not on file   Stress:     Feeling of Stress : Not on file   Social Connections:     Frequency of Communication with Friends and Family: Not on file    Frequency of Social Gatherings with Friends and Family: Not on file    Attends [employer] Services: Not on file    Active Member of Clubs or Organizations: Not on file    Attends Club or Organization Meetings: Not on file    Marital Status: Not on file   Intimate Partner Violence:     Fear of Current or Ex-Partner: Not on file    Emotionally Abused: Not on file    Physically Abused: Not on file    Sexually Abused: Not on file   Housing Stability:     Unable to Pay for Housing in the Last Year: Not on file    Number of Places Lived in the Last Year: Not on file    Unstable Housing in the Last Year: Not on file     Current Facility-Administered Medications   Medication Dose Route Frequency Provider Last Rate Last Admin    acetaminophen (TYLENOL) tablet 1,000 mg  1,000 mg Oral Once Archana Frausto MD        metoclopramide (REGLAN) injection 10 mg  10 mg IntraVENous Once Archana Frausto MD        diphenhydrAMINE (BENADRYL) injection 50 mg  50 mg IntraVENous Once Archana Frausto MD        sodium chloride flush 0.9 % injection 10 mL  10 mL IntraVENous PRN Archana Frausto MD         Current Outpatient Medications   Medication Sig Dispense Refill    alendronate (FOSAMAX) 70 MG tablet Take 70 mg by mouth every 7 days       busPIRone (BUSPAR) 30 MG tablet Take 30 mg by mouth 2 times daily       enalapril (VASOTEC) 5 MG tablet Take 5 mg by mouth 2 times daily       vitamin B-12 (CYANOCOBALAMIN) 1000 MCG tablet Take 1,000 mcg by mouth daily      oxybutynin (DITROPAN) 5 MG tablet Take 5 mg by mouth daily      cetirizine (ZYRTEC) 10 MG tablet Take 10 mg by mouth daily      levothyroxine (SYNTHROID) 50 MCG tablet Take 50 mcg by mouth Daily      gabapentin (NEURONTIN) 400 MG capsule Take 600 mg by mouth 2 times daily.  Cholecalciferol (VITAMIN D3) 5000 UNITS TABS Take by mouth daily       ropinirole (REQUIP) 0.5 MG tablet Take 0.5 mg by mouth once.  trazodone (DESYREL) 50 MG tablet Take 100 mg by mouth nightly       simvastatin (ZOCOR) 20 MG tablet Take 20 mg by mouth nightly.         sertraline (ZOLOFT) 50 MG tablet Take 150 mg by mouth daily       tizanidine (ZANAFLEX) 2 MG capsule Take 4 mg by mouth 2 times daily        No Known Allergies    Nursing Notes Reviewed    Physical Exam:  ED Triage Vitals   Enc Vitals Group      BP 03/02/22 1330 (!) 137/109      Pulse 03/02/22 1330 56      Resp 03/02/22 1330 20      Temp 03/02/22 1341 97.7 °F (36.5 °C)      Temp Source 03/02/22 1341 Oral      SpO2 03/02/22 1330 100 % Weight 03/02/22 1330 205 lb (93 kg)      Height 03/02/22 1330 5' 4\" (1.626 m)      Head Circumference --       Peak Flow --       Pain Score --       Pain Loc --       Pain Edu? --       Excl. in [address]? --        My pulse ox interpretation is - normal    General appearance:  No acute distress. Appears uncomfortable but overall nontoxic. Skin:  Warm. Dry. No diaphoresis. No rash to exposed skin. Eye:  Extraocular movements intact. Pupils are equal round react light. Ears, nose, mouth and throat:  Oral mucosa moist   Neck:  Trachea midline. No meningismus or rigidity. Extremity:  No swelling. Normal ROM     Heart:  Regular rate and rhythm, normal S1 & S2, no extra heart sounds. Perfusion:  Intact   Respiratory:  Lungs clear to auscultation bilaterally. Respirations nonlabored. Speaking clearly in full sentences. No respiratory distress. Abdominal:  Normal bowel sounds. Soft. Nontender. Non distended. Back:  No CVA tenderness to palpation     Neurological:  Alert and oriented times 3. No focal neuro deficits. Sensation intact to light touch to distal upper/lower extremities; 5/5 and symmetric shrug, , HF, KF/KE, and dorsi/plantar flexion; symmetric brow raise and nasolabial folds, tongue midline; FTN and YAAKOV intact; 2+ and symmetric reflexes to bilateral upper/lower extremities; ambulates with steady gait; no dysarthria or aphasia           Psychiatric:  Appropriate    I have reviewed and interpreted all of the currently available lab results from this visit (if applicable):  No results found for this visit on 03/02/22. Radiographs (if obtained):  [] The following radiograph was interpreted by myself in the absence of a radiologist:   [x] Radiologist's Report Reviewed:  CTA HEAD NECK W CONTRAST   Final Result   Moderate to severe stenosis in P3 segment of the right PCA.       Otherwise, no acute abnormality or flow-limiting stenosis in the remainder of   the major arteries of the head and neck.         CT HEAD WO CONTRAST   Final Result   No acute intracranial abnormality. EKG (if obtained): (All EKG's are interpreted by myself in the absence of a cardiologist)  12 lead EKG per my interpretation:  Normal Sinus Rhythm at 60  Axis is   Normal  QTc is  within an acceptable range  There is no specific T wave changes appreciated. There is no specific ST wave changes appreciated. No STEMI    Prior EKG to compare with was available and no clinically significant change in morphology when compared to prior. Chart review shows recent radiographs:  No results found. Procedure Note - Lumbar Puncture: The risks (including but not limited to pain, bleeding, infection and post-procedure headache) and benefits of lumbar puncture were discussed with the patient. Questions were sought and answered and Written consent was obtained for the procedure. Yvon Herrera was placed in a seated position and leaned over a bedside table. Prior to lumbar puncture a time out with nursing was called. The lumbar region was then prepped and draped in standard bedside fashion. The L4 interspace was identified with physical landmarks. The overlying skin and interspace was anesthetized with 5ml of Lidocaine 1% with epinephrine. A 22 gauge spinal needle was inserted into the interspace with return of clear CSF. The CSF was collected and sent to the lab for analysis. The patient tolerated the procedure well without complications and my repeat neurovascular exam post-procedure is unchanged. MDM:  Pt presents as above. Emergent conditions considered. Presentation prompted initial history and physical..  Patient was immediately evaluated given the story and expedited to CT. CT head without and CTA imaging are negative for acute process.   After discussion of risks and benefits patient is electing to pursue lumbar puncture for further diagnostics and to rule out subarachnoid hemorrhage given the overall clinical picture. Lumbar puncture is pursued and is with clear output. Initial CSF studies are reassuring but cell count is pending on signout to my partner, Dr. Ian Fernández who will be following up. I do suspect given patient's overall well appearance on rechecks that she has more of a benign post colloidal headache and not a head bleed however CSF studies are pending. Patient did receive numerous medications for treatment of her headache with improvement. I discussed specific signs and symptoms on when to return to the emergency department as well as the need for close outpatient follow-up. Questions sought and answered with the patient and . They voice understanding and agree with plan. CRITICAL CARE NOTE:  There was a high probability of clinically significant life-threatening deterioration of the patient's condition requiring my urgent intervention due to rule out subarachnoid. Immediate evaluation, escorted to CT, direct interpretation of CT imaging, several rechecks throughout ED course and chart review including medication review and prior visits was performed to address this. Total critical care time is 35 minutes. This includes vital sign monitoring, pulse oximetry monitoring, telemetry monitoring, clinical response to the IV medications, reviewing the nursing notes, consultation time, dictation/documentation time, and interpretation of the lab work. This time excludes time spent performing procedures and separately billable procedures and family discussion time. Care of this patient occurred during the COVID-19 pandemic. Clinical Impression:  1. Acute nonintractable headache, unspecified headache type    2. Elevated blood pressure reading      Disposition referral (if applicable):  No follow-up provider specified.   Disposition medications (if applicable):  New Prescriptions    No medications on file       Comment: Please note this report has been produced using speech recognition software and may contain errors related to that system including errors in grammar, punctuation, and spelling, as well as words and phrases that may be inappropriate. If there are any questions or concerns please feel free to contact the dictating provider for clarification.        Cullen Staples MD  03/03/22 [id number]

## 2022-03-02 NOTE — ED NOTES
Dr. Scottie Malagon at bedside to perform LP, Consent obtained.       Benjamin Lauren RN  03/02/22 4604

## 2022-03-02 NOTE — ED NOTES
Patient instructed to lay supine for 30 minutes after LP. Patient voiced understanding.       Taye Banks RN  03/02/22 5976

## 2022-03-02 NOTE — ED TRIAGE NOTES
Patient arrives via EMS for complaints of throbbing, pulsating pain in back of head that started approximately 30 prior to her arrival.  Patient reports she fell last week hitting her head and kidney area but this pain is new. , strength equal and strong to extremities x4, no facial droop or slurred speech noted.

## 2022-03-03 LAB
EKG ATRIAL RATE: 60 BPM
EKG DIAGNOSIS: NORMAL
EKG P AXIS: 20 DEGREES
EKG P-R INTERVAL: 158 MS
EKG Q-T INTERVAL: 426 MS
EKG QRS DURATION: 82 MS
EKG QTC CALCULATION (BAZETT): 426 MS
EKG R AXIS: 43 DEGREES
EKG T AXIS: 42 DEGREES
EKG VENTRICULAR RATE: 60 BPM

## 2022-03-03 PROCEDURE — 93010 ELECTROCARDIOGRAM REPORT: CPT | Performed by: INTERNAL MEDICINE

## 2022-03-03 NOTE — ED PROVIDER NOTES
Please refer to the documentation completed by Dr. Shukri Lr for full details of the encounter.     Results:  Results for orders placed or performed during the hospital encounter of 03/02/22   Culture, CSF    Specimen: CSF   Result Value Ref Range    Specimen CSF     Special Requests NONE     Gram Smear NO  NECROTIC POLYS       Gram Smear NO  RED BLOOD CELLS       Gram Smear NO ORGANISMS SEEN     Gram Smear CONCENTRATED CYTOSPIN PREP (A)    CBC with Auto Differential   Result Value Ref Range    WBC 5.1 4.0 - 10.5 K/CU MM    RBC 4.51 4.2 - 5.4 M/CU MM    Hemoglobin 13.4 12.5 - 16.0 GM/DL    Hematocrit 44.2 37 - 47 %    MCV 98.0 78 - 100 FL    MCH 29.7 27 - 31 PG    MCHC 30.3 (L) 32.0 - 36.0 %    RDW 12.3 11.7 - 14.9 %    Platelets 039 827 - 715 K/CU MM    MPV 10.1 7.5 - 11.1 FL    Differential Type AUTOMATED DIFFERENTIAL     Segs Relative 39.7 36 - 66 %    Lymphocytes % 46.5 (H) 24 - 44 %    Monocytes % 8.7 (H) 0 - 4 %    Eosinophils % 4.5 (H) 0 - 3 %    Basophils % 0.4 0 - 1 %    Segs Absolute 2.0 K/CU MM    Lymphocytes Absolute 2.4 K/CU MM    Monocytes Absolute 0.4 K/CU MM    Eosinophils Absolute 0.2 K/CU MM    Basophils Absolute 0.0 K/CU MM    Nucleated RBC % 0.0 %    Total Nucleated RBC 0.0 K/CU MM    Total Immature Neutrophil 0.01 K/CU MM    Immature Neutrophil % 0.2 0 - 0.43 %   Comprehensive Metabolic Panel   Result Value Ref Range    Sodium 136 135 - 145 MMOL/L    Potassium 3.8 3.5 - 5.1 MMOL/L    Chloride 104 99 - 110 mMol/L    CO2 19 (L) 21 - 32 MMOL/L    BUN 9 6 - 23 MG/DL    CREATININE 1.0 0.6 - 1.1 MG/DL    Glucose 92 70 - 99 MG/DL    Calcium 9.0 8.3 - 10.6 MG/DL    Albumin 3.5 3.4 - 5.0 GM/DL    Total Protein 6.5 6.4 - 8.2 GM/DL    Total Bilirubin 0.4 0.0 - 1.0 MG/DL    ALT 18 10 - 40 U/L    AST 25 15 - 37 IU/L    Alkaline Phosphatase 64 40 - 129 IU/L    GFR Non- 57 (L) >60 mL/min/1.73m2    GFR African American >60 >60 mL/min/1.73m2    Anion Gap 13 4 - 16   Protime/INR & PTT   Result Value Ref Range    Protime 11.8 11.7 - 14.5 SECONDS    INR 0.92 INDEX    aPTT 27.5 25.1 - 37.1 SECONDS   Glucose, CSF   Result Value Ref Range    Glucose, CSF 47 40 - 75 MG/DL   Protein, CSF   Result Value Ref Range    Protein, CSF 41 15 - 45 MG/DL   Cell Count with Differential, CSF   Result Value Ref Range    RBC, CSF 0 0 CU MM    WBC, CSF 0 0 - 5 CU MM    Other Cells, CSF OTHER CELL TYPE NOT SEEN CU MM    Tube # CELL COUNT TUBE # - 4     Segs 0 %    Lymphs, CSF 0 %    Other Cells, CSF 0 %   Cell Count with Differential, CSF   Result Value Ref Range    RBC, CSF 10 (H) 0 CU MM    WBC, CSF 2 0 - 5 CU MM    Other Cells, CSF OTHER CELL TYPE NOT SEEN CU MM    Tube # CELL COUNT TUBE # - 1     Segs 0 %    Lymphs, CSF 0 %    Other Cells, CSF 0 %     Radiology:  CT HEAD WO CONTRAST    Result Date: 3/2/2022  EXAMINATION: CT OF THE HEAD WITHOUT CONTRAST  3/2/2022 1:55 pm TECHNIQUE: CT of the head was performed without the administration of intravenous contrast. Dose modulation, iterative reconstruction, and/or weight based adjustment of the mA/kV was utilized to reduce the radiation dose to as low as reasonably achievable. COMPARISON: 03/09/2020 HISTORY: ORDERING SYSTEM PROVIDED HISTORY: sudden headache, posterior and bilateral temples TECHNOLOGIST PROVIDED HISTORY: Reason for exam:->sudden headache, posterior and bilateral temples Has a \"code stroke\" or \"stroke alert\" been called? ->No Decision Support Exception - unselect if not a suspected or confirmed emergency medical condition->Emergency Medical Condition (MA) Reason for Exam: sudden headache, posterior and bilateral temples FINDINGS: BRAIN/VENTRICLES: There is no acute intracranial hemorrhage, mass effect or midline shift. No abnormal extra-axial fluid collection. The gray-white differentiation is maintained without evidence of an acute infarct. There is no evidence of hydrocephalus.  Stable ventricular dilation unchanged ORBITS: The visualized portion of the orbits internal carotid, anterior cerebral, or middle cerebral arteries. No aneurysm. POSTERIOR CIRCULATION: There is moderate to severe stenosis in P3 segment of the right PCA. No significant stenosis of the vertebral, basilar, or left posterior cerebral arteries. No aneurysm. OTHER: No dural venous sinus thrombosis on this non-dedicated study. BRAIN: No mass effect or midline shift. No extra-axial fluid collection. The gray-white differentiation is maintained. Moderate to severe stenosis in P3 segment of the right PCA. Otherwise, no acute abnormality or flow-limiting stenosis in the remainder of the major arteries of the head and neck. Emergency department course:  Patient was initially seen by Dr. Luz Marina Rodriguez. Initial report is provided at shift change at approximately 1640. Patient presented with significant headache after intercourse. Initial imaging did not reveal detectable hemorrhage, aneurysm, or other discrete source. Follow-up lumbar puncture was performed. Fluid was initially clear, but a formal cell count is still pending. Headache has been gradually improving. If the lumbar puncture is reassuring, continuing outpatient management should be appropriate. Upon most recent reevaluation, patient reports that she is feeling better and is \"ready to go. \"  Headache is down to 3/10. She denies any neck stiffness, light or sound sensitivity, weakness, numbness, tingling, or feverish feeling. Cell count shows no white or red cells. We have discussed continuing outpatient management and symptomatic treatment. Blood pressure is elevated, though there has been no evidence of developing endorgan injury. We have discussed all available results. Patient is satisfied with evaluation and agreeable to recommendations. Patient has had the opportunity to ask questions, and they have been answered to the best of my ability.   Instructions are given to follow-up with primary care provider for reevaluation and further testing. Very strict return and follow-up instructions are provided. Clinical Impression:  1. Acute nonintractable headache, unspecified headache type    2.  Elevated blood pressure reading      Disposition referral (if applicable):  LISA Ahuja NP  [facility]  [id number]  [place] [address]  [phone]    Schedule an appointment as soon as possible for a visit       [address] [facility] Emergency Department  [place] [facility] [id number] [phone]  Go to   As needed, If symptoms worsen    Disposition medications (if applicable):  Discharge Medication List as of 3/2/2022  6:47 PM      START taking these medications    Details   indomethacin (INDOCIN) 50 MG capsule Take 1 capsule by mouth 2 times daily as needed for Pain (Headache), Disp-12 capsule, R-0Normal      ondansetron (ZOFRAN-ODT) 4 MG disintegrating tablet Take 1 tablet by mouth 3 times daily as needed for Nausea or Vomiting (Headache), Disp-21 tablet, R-0Normal           ED Provider Disposition Time  DISPOSITION Decision To Discharge 03/02/2022 06:20:47 PM        Elicia Damian MD  03/02/22 2040

## 2022-03-09 LAB
CULTURE: ABNORMAL
GRAM SMEAR: ABNORMAL
Lab: ABNORMAL
SPECIMEN: ABNORMAL

## 2022-03-23 ENCOUNTER — OFFICE VISIT (OUTPATIENT)
Dept: OBGYN | Age: 62
End: 2022-03-23
Payer: MEDICAID

## 2022-03-23 VITALS
SYSTOLIC BLOOD PRESSURE: 118 MMHG | HEART RATE: 66 BPM | HEIGHT: 64 IN | WEIGHT: 215 LBS | BODY MASS INDEX: 36.7 KG/M2 | DIASTOLIC BLOOD PRESSURE: 84 MMHG

## 2022-03-23 DIAGNOSIS — Z01.419 ENCOUNTER FOR WELL WOMAN EXAM WITH ROUTINE GYNECOLOGICAL EXAM: Primary | ICD-10-CM

## 2022-03-23 PROCEDURE — G8484 FLU IMMUNIZE NO ADMIN: HCPCS | Performed by: OBSTETRICS & GYNECOLOGY

## 2022-03-23 PROCEDURE — 99396 PREV VISIT EST AGE 40-64: CPT | Performed by: OBSTETRICS & GYNECOLOGY

## 2022-03-23 SDOH — ECONOMIC STABILITY: FOOD INSECURITY: WITHIN THE PAST 12 MONTHS, THE FOOD YOU BOUGHT JUST DIDN'T LAST AND YOU DIDN'T HAVE MONEY TO GET MORE.: NEVER TRUE

## 2022-03-23 SDOH — ECONOMIC STABILITY: FOOD INSECURITY: WITHIN THE PAST 12 MONTHS, YOU WORRIED THAT YOUR FOOD WOULD RUN OUT BEFORE YOU GOT MONEY TO BUY MORE.: NEVER TRUE

## 2022-03-23 ASSESSMENT — SOCIAL DETERMINANTS OF HEALTH (SDOH): HOW HARD IS IT FOR YOU TO PAY FOR THE VERY BASICS LIKE FOOD, HOUSING, MEDICAL CARE, AND HEATING?: NOT HARD AT ALL

## 2022-03-23 ASSESSMENT — PATIENT HEALTH QUESTIONNAIRE - PHQ9
1. LITTLE INTEREST OR PLEASURE IN DOING THINGS: 0
SUM OF ALL RESPONSES TO PHQ QUESTIONS 1-9: 0
SUM OF ALL RESPONSES TO PHQ QUESTIONS 1-9: 0
SUM OF ALL RESPONSES TO PHQ9 QUESTIONS 1 & 2: 0
SUM OF ALL RESPONSES TO PHQ QUESTIONS 1-9: 0
SUM OF ALL RESPONSES TO PHQ QUESTIONS 1-9: 0
2. FEELING DOWN, DEPRESSED OR HOPELESS: 0

## 2022-03-24 NOTE — PROGRESS NOTES
3/24/22    Marco Antonio Lancaster  1960    Chief Complaint   Patient presents with    Gynecologic Exam     pt had hysterectomy about 8 years ago both ovary removal. last pap 2021 normal. maribel 2021 normal. dexa 2021 osteop. pt is taking estradiol cream. no gyn problems. Marco Antonio Lancaster is a 63 y.o. female who presents today for evaluation of annual examination    Past Medical History:   Diagnosis Date    Arthritis     osteoarthritis    Degenerative disc disease     Fibromyalgia     Hyperlipidemia     Hypertension     Hypothyroidism 12/16/2019       Past Surgical History:   Procedure Laterality Date    BREAST BIOPSY  2000    BREAST SURGERY      biopsy    HEMORRHOID SURGERY      HYSTERECTOMY      TUBAL LIGATION         Social History     Tobacco Use    Smoking status: Never Smoker    Smokeless tobacco: Never Used   Vaping Use    Vaping Use: Never used   Substance Use Topics    Alcohol use:  Yes    Drug use: No       Family History   Problem Relation Age of Onset    Heart Disease Mother     High Blood Pressure Mother     Diabetes Father     Heart Disease Brother     Diabetes Brother     High Blood Pressure Brother        Current Outpatient Medications   Medication Sig Dispense Refill    indomethacin (INDOCIN) 50 MG capsule Take 1 capsule by mouth 2 times daily as needed for Pain (Headache) 12 capsule 0    ondansetron (ZOFRAN-ODT) 4 MG disintegrating tablet Take 1 tablet by mouth 3 times daily as needed for Nausea or Vomiting (Headache) 21 tablet 0    alendronate (FOSAMAX) 70 MG tablet Take 70 mg by mouth every 7 days       busPIRone (BUSPAR) 30 MG tablet Take 30 mg by mouth 2 times daily       enalapril (VASOTEC) 5 MG tablet Take 5 mg by mouth 2 times daily       vitamin B-12 (CYANOCOBALAMIN) 1000 MCG tablet Take 1,000 mcg by mouth daily      oxybutynin (DITROPAN) 5 MG tablet Take 5 mg by mouth daily      cetirizine (ZYRTEC) 10 MG tablet Take 10 mg by mouth daily      levothyroxine (SYNTHROID) 50 MCG tablet Take 50 mcg by mouth Daily      gabapentin (NEURONTIN) 400 MG capsule Take 600 mg by mouth 2 times daily.  Cholecalciferol (VITAMIN D3) 5000 UNITS TABS Take by mouth daily       ropinirole (REQUIP) 0.5 MG tablet Take 0.5 mg by mouth once.  trazodone (DESYREL) 50 MG tablet Take 100 mg by mouth nightly       simvastatin (ZOCOR) 20 MG tablet Take 20 mg by mouth nightly.  sertraline (ZOLOFT) 50 MG tablet Take 150 mg by mouth daily       tizanidine (ZANAFLEX) 2 MG capsule Take 4 mg by mouth 2 times daily        No current facility-administered medications for this visit. No Known Allergies        Immunization History   Administered Date(s) Administered    COVID-19, Niki Lugo, Primary or Immunocompromised, PF, 100mcg/0.5mL 2020, 2021    Influenza Virus Vaccine 10/01/2019    Influenza, MDCK, Preservative free 10/14/2019       Review of Systems  All other systems reviewed and are negative    /84 (Site: Right Upper Arm, Position: Sitting, Cuff Size: Medium Adult)   Pulse 66   Ht 5' 4\" (1.626 m)   Wt 215 lb (97.5 kg)   BMI 36.90 kg/m²     Physical Exam  Nursing note reviewed. HENT:      Head: Normocephalic. Nose: Nose normal.   Eyes:      Extraocular Movements: Extraocular movements intact. Pulmonary:      Effort: Pulmonary effort is normal.   Abdominal:      General: Abdomen is flat. Palpations: Abdomen is soft. Musculoskeletal:      Cervical back: Normal range of motion. Skin:     General: Skin is warm. Neurological:      Mental Status: She is alert. Pelvic examination revealed cervix uterus and ovaries surgically removed. Urogenital atrophy. No results found for this visit on 22. ASSESSMENT AND PLAN   Diagnosis Orders   1. Encounter for well woman exam with routine gynecological exam     Patient will have her routine mammogram colonoscopy and bone density screenings performed at the proper intervals. She will continue with her estrogen vaginal cream.  We also discussed the possibility of Vic Dowd for urogenital atrophy symptoms    Return for Annual examination.     Cruz Vazquez MD

## 2022-04-15 ENCOUNTER — HOSPITAL ENCOUNTER (OUTPATIENT)
Dept: CT IMAGING | Age: 62
Discharge: HOME | End: 2022-04-15
Payer: MEDICAID

## 2022-04-15 DIAGNOSIS — S00.83XA CONTUSION OF CHEEK, INITIAL ENCOUNTER: ICD-10-CM

## 2022-04-15 PROCEDURE — 70486 CT MAXILLOFACIAL W/O DYE: CPT

## 2022-05-18 ENCOUNTER — HOSPITAL ENCOUNTER (OUTPATIENT)
Dept: SLEEP CENTER | Age: 62
Discharge: HOME | End: 2022-05-18
Payer: MEDICAID

## 2022-05-18 VITALS — WEIGHT: 210 LBS | HEIGHT: 64 IN | BODY MASS INDEX: 35.85 KG/M2

## 2022-05-18 DIAGNOSIS — G47.33 OSA (OBSTRUCTIVE SLEEP APNEA): ICD-10-CM

## 2022-05-18 DIAGNOSIS — G47.10 HYPERSOMNIA: ICD-10-CM

## 2022-05-18 DIAGNOSIS — E66.9 OBESITY (BMI 30-39.9): ICD-10-CM

## 2022-05-18 PROCEDURE — 99211 OFF/OP EST MAY X REQ PHY/QHP: CPT

## 2022-05-18 PROCEDURE — 99204 OFFICE O/P NEW MOD 45 MIN: CPT | Performed by: INTERNAL MEDICINE

## 2022-05-18 ASSESSMENT — SLEEP AND FATIGUE QUESTIONNAIRES
HOW LIKELY ARE YOU TO NOD OFF OR FALL ASLEEP WHEN YOU ARE A PASSENGER IN A CAR FOR AN HOUR WITHOUT A BREAK: 1
HOW LIKELY ARE YOU TO NOD OFF OR FALL ASLEEP WHILE SITTING INACTIVE IN A PUBLIC PLACE: 2
HOW LIKELY ARE YOU TO NOD OFF OR FALL ASLEEP WHILE SITTING QUIETLY AFTER LUNCH WITHOUT ALCOHOL: 1
HOW LIKELY ARE YOU TO NOD OFF OR FALL ASLEEP WHILE WATCHING TV: 1
HOW LIKELY ARE YOU TO NOD OFF OR FALL ASLEEP IN A CAR, WHILE STOPPED FOR A FEW MINUTES IN TRAFFIC: 0
HOW LIKELY ARE YOU TO NOD OFF OR FALL ASLEEP WHILE SITTING AND TALKING TO SOMEONE: 1
HOW LIKELY ARE YOU TO NOD OFF OR FALL ASLEEP WHILE LYING DOWN TO REST IN THE AFTERNOON WHEN CIRCUMSTANCES PERMIT: 3
ESS TOTAL SCORE: 12
HOW LIKELY ARE YOU TO NOD OFF OR FALL ASLEEP WHILE SITTING AND READING: 3

## 2022-05-18 NOTE — CONSULTS
Dara Pedraza MD, Marino Hartley MD, Soumya Dobbs MD, [facility]      [address] [facility]. [address], [address] [facility]   [place]: [phone]  F: [phone]     Subjective:     Patient ID: Wallace Jacobo is a 63 y.o. female, referred to the sleep center for   Chief Complaint   Patient presents with    Fatigue   .     Referring physician: LISA Orantes NP      History:has been referred for the CANDIS    Symptoms:   [x]  Snoring                                                                    [x]  Dry Mouth  []  Choking                                                                   []  Morning Headaches  []  Gasping for Air                                                        []  Trouble Falling asleep  [x]  Tired during the daytime                                         []  Trouble Staying Asleep  [x]  Tired when you wake up                                         [x]  Weight Gain in Last 5 Years  [x]  Wake up frequently at night                                    []  Weight Loss in Last 5 Years  []  Shortness Of Breath                                               []  Shift Worker  []  Coughing                                                                []  Smoker (Previous or Current)  []  Chest Pain                                                              [x]  Anxiety  []  Trouble keeping your legs still at night                   [x]  Depression  []  Kicking your legs in your sleep                               []  Insomnia       [x]  Stop breathing  []  Palpitations       [] Acting out the Dream  []  Other:       Significant Co-morbidities:  []  Congestive Heart Failure     []  COPD         []  Stroke (Past 30 Days)      []  Supplemental Oxygen Usage       []  Cognitive Impairment      []  Neuromuscular Problems  []  Epilepsy/Neurological Disorders      []CAD  []Afib        Social History     Socioeconomic History    Marital status: Single     Spouse name: Not on file    Number of children: Not on file    Years of education: Not on file    Highest education level: Not on file   Occupational History    Not on file   Tobacco Use    Smoking status: Never Smoker    Smokeless tobacco: Never Used   Vaping Use    Vaping Use: Never used   Substance and Sexual Activity    Alcohol use: Yes    Drug use: No    Sexual activity: Yes     Partners: Male   Other Topics Concern    Not on file   Social History Narrative    Not on file     Social Determinants of Health     Financial Resource Strain: Low Risk     Difficulty of Paying Living Expenses: Not hard at all   Food Insecurity: No Food Insecurity    Worried About Running Out of Food in the Last Year: Never true    [address] in the Last Year: Never true   Transportation Needs:     Lack of Transportation (Medical): Not on file    Lack of Transportation (Non-Medical): Not on file   Physical Activity:     Days of Exercise per Week: Not on file    Minutes of Exercise per Session: Not on file   Stress:     Feeling of Stress : Not on file   Social Connections:     Frequency of Communication with Friends and Family: Not on file    Frequency of Social Gatherings with Friends and Family: Not on file    Attends Faith Services: Not on file    Active Member of [address] [place] [address] or Organizations: Not on file    Attends Club or Organization Meetings: Not on file    Marital Status: Not on file   Intimate Partner Violence:     Fear of Current or Ex-Partner: Not on file    Emotionally Abused: Not on file    Physically Abused: Not on file    Sexually Abused: Not on file   Housing Stability:     Unable to Pay for Housing in the Last Year: Not on file    Number of [place] in the Last Year: Not on file    Unstable Housing in the Last Year: Not on file       Prior to Admission medications    Medication Sig Start Date End Date Taking?  Authorizing Provider   ondansetron (ZOFRAN-ODT) 4 MG disintegrating tablet Take 1 tablet by mouth 3 times daily as needed for Nausea or Vomiting (Headache) 3/2/22  Yes Julia Johnson MD   alendronate (FOSAMAX) 70 MG tablet Take 70 mg by mouth every 7 days  5/17/21  Yes Historical Provider, MD   busPIRone (BUSPAR) 30 MG tablet Take 30 mg by mouth 2 times daily  5/4/21  Yes Historical Provider, MD   enalapril (VASOTEC) 5 MG tablet Take 5 mg by mouth 2 times daily  5/10/21  Yes Historical Provider, MD   vitamin B-12 (CYANOCOBALAMIN) 1000 MCG tablet Take 1,000 mcg by mouth daily   Yes Historical Provider, MD   oxybutynin (DITROPAN) 5 MG tablet Take 5 mg by mouth daily   Yes Historical Provider, MD   cetirizine (ZYRTEC) 10 MG tablet Take 10 mg by mouth daily   Yes Historical Provider, MD   levothyroxine (SYNTHROID) 50 MCG tablet Take 50 mcg by mouth Daily   Yes Historical Provider, MD   gabapentin (NEURONTIN) 400 MG capsule Take 600 mg by mouth 2 times daily. Yes Historical Provider, MD   Cholecalciferol (VITAMIN D3) 5000 UNITS TABS Take by mouth daily    Yes Historical Provider, MD   ropinirole (REQUIP) 0.5 MG tablet Take 0.5 mg by mouth once. Yes Historical Provider, MD   trazodone (DESYREL) 50 MG tablet Take 100 mg by mouth nightly    Yes Historical Provider, MD   simvastatin (ZOCOR) 20 MG tablet Take 20 mg by mouth nightly.      Yes Historical Provider, MD   sertraline (ZOLOFT) 50 MG tablet Take 150 mg by mouth daily    Yes Historical Provider, MD   tizanidine (ZANAFLEX) 2 MG capsule Take 4 mg by mouth 2 times daily    Yes Historical Provider, MD   indomethacin (INDOCIN) 50 MG capsule Take 1 capsule by mouth 2 times daily as needed for Pain (Headache)  Patient not taking: Reported on 5/18/2022 3/2/22   Julia Johnson MD       Allergies as of 05/18/2022    (No Known Allergies)       Patient Active Problem List   Diagnosis    CKD (chronic kidney disease), stage III ([facility] [address]    HTN (hypertension)    Hypothyroidism    OA (osteoarthritis)    CANDIS (obstructive sleep apnea)    Obesity (BMI 30-39. 9)    Hypersomnia       Past Medical History:   Diagnosis Date    Arthritis     osteoarthritis    Degenerative disc disease     Fibromyalgia     Hyperlipidemia     Hypertension     Hypothyroidism 12/16/2019       Past Surgical History:   Procedure Laterality Date    BREAST BIOPSY  2000    BREAST SURGERY      biopsy    HEMORRHOID SURGERY      HYSTERECTOMY      TUBAL LIGATION         Family History   Problem Relation Age of Onset    Heart Disease Mother     High Blood Pressure Mother     Diabetes Father     Heart Disease Brother     Diabetes Brother     High Blood Pressure Brother          Objective:   Ht 5' 4\" (1.626 m)   Wt 210 lb (95.3 kg)   BMI 36.05 kg/m²   Body mass index is 36.05 kg/m². Sleep Medicine 5/18/2022   Sitting and reading 3   Watching TV 1   Sitting, inactive in a public place (e.g. a theatre or a meeting) 2   As a passenger in a car for an hour without a break 1   Lying down to rest in the afternoon when circumstances permit 3   Sitting and talking to someone 1   Sitting quietly after a lunch without alcohol 1   In a car, while stopped for a few minutes in traffic 0   Total score 12       Vitals:    05/18/22 1414   Weight: 210 lb (95.3 kg)   Height: 5' 4\" (1.626 m)        Inches  [place] - Total score: 12    Gen: No distress. Eyes: PERRL. No sclera icterus. No conjunctival injection. ENT: No discharge. Pharynx clear. External appearance of ears and nose normal.OVERJET  Neck: Trachea midline. No obvious mass. Resp: No accessory muscle use. No crackles. No wheezes. No rhonchi. No dullness on percussion. CV: Regular rate. Regular rhythm. No murmur or rub. No edema. GI: Non-tender. Non-distended. No hernia. Skin: Warm, dry, normal texture and turgor. No nodule on exposed extremities. Lymph: No cervical LAD. No supraclavicular LAD. M/S: No cyanosis. No clubbing. No joint deformity. Psych: Oriented x 3. No anxiety. Awake. Alert. Intact judgement and insight. Mallampati Airway Classification:   []1 []2 [x]3 []4        Assessment and Plan     Diagnosis:    Problem List        Respiratory    CANDIS (obstructive sleep apnea)      She has symptoms of CANDIS  Advised to go for the PSG  Loose weight         Relevant Orders    Home Sleep Study       Other    Obesity (BMI 30-39. 9)      Advised to loose weight with diet and exercise           Hypersomnia      Advised to go for the PSG  Loose weight                     Additional Plan:     [x]  Sleep hygiene/ relaxation methods & CBTi principles review with patient   [x]  Avoid supine/back sleep until sleep study   [x]  Driving precautions   [x]  Medical consequences of untreated CANDIS   [x]  Weight loss recommendations   [x]  Diet recommendations   [x]  Exercise   []  Advised to quit smoking       []  PFT referral   []  Bariatric Program referral      Follow-Up:    No follow-ups on file. Mary Luis is a 63 y.o. female being evaluated by a Virtual Visit (video visit) encounter to address concerns as mentioned above. A caregiver was present when appropriate. Due to this being a TeleHealth encounter (During [place] [address] public health emergency), evaluation of the following organ systems was limited: Vitals/Constitutional/EENT/Resp/CV/GI//MS/Neuro/Skin/Heme-Lymph-Imm. Pursuant to the emergency declaration under the [address] [facility], [address] [place] [address] waiver authority and the [employer] and [employer] Act, this Virtual Visit was conducted with patient's (and/or legal guardian's) consent, to reduce the patient's risk of exposure to COVID-19 and provide necessary medical care. The patient (and/or legal guardian) has also been advised to contact this office for worsening conditions or problems, and seek emergency medical treatment and/or call 911 if deemed necessary.      Patient identification was verified at the start of the visit: Yes    Total time spent for this encounter:     Services were provided through a video synchronous discussion virtually to substitute for in-person clinic visit. Patient and provider were located at their individual homes. --Cintia Cadet MD on 5/18/2022 at 2:27 PM    An electronic signature was used to authenticate this note.      Electronically signed by Cintia Cadet MD on 5/18/2022 at 2:27 PM

## 2022-05-19 ENCOUNTER — HOSPITAL ENCOUNTER (OUTPATIENT)
Dept: ULTRASOUND IMAGING | Age: 62
Discharge: HOME | End: 2022-05-19
Payer: MEDICAID

## 2022-05-19 ENCOUNTER — HOSPITAL ENCOUNTER (OUTPATIENT)
Age: 62
Discharge: HOME | End: 2022-05-19
Payer: MEDICAID

## 2022-05-19 DIAGNOSIS — N18.30 STAGE 3 CHRONIC KIDNEY DISEASE, UNSPECIFIED WHETHER STAGE 3A OR 3B CKD (HCC): ICD-10-CM

## 2022-05-19 LAB
ALBUMIN SERPL-MCNC: 4.4 GM/DL (ref 3.4–5)
ALP BLD-CCNC: 70 IU/L (ref 40–128)
ALT SERPL-CCNC: 19 U/L (ref 10–40)
ANION GAP SERPL CALCULATED.3IONS-SCNC: 14 MMOL/L (ref 4–16)
AST SERPL-CCNC: 24 IU/L (ref 15–37)
BACTERIA: NEGATIVE /HPF
BILIRUB SERPL-MCNC: 0.3 MG/DL (ref 0–1)
BILIRUBIN URINE: NEGATIVE MG/DL
BLOOD, URINE: NEGATIVE
BUN BLDV-MCNC: 11 MG/DL (ref 6–23)
CALCIUM SERPL-MCNC: 9.2 MG/DL (ref 8.3–10.6)
CHLORIDE BLD-SCNC: 104 MMOL/L (ref 99–110)
CLARITY: CLEAR
CO2: 21 MMOL/L (ref 21–32)
COLOR: YELLOW
CREAT SERPL-MCNC: 1 MG/DL (ref 0.6–1.1)
GFR AFRICAN AMERICAN: >60 ML/MIN/1.73M2
GFR NON-AFRICAN AMERICAN: 57 ML/MIN/1.73M2
GLUCOSE BLD-MCNC: 72 MG/DL (ref 70–99)
GLUCOSE, URINE: NEGATIVE MG/DL
KETONES, URINE: NEGATIVE MG/DL
LEUKOCYTE ESTERASE, URINE: ABNORMAL
MUCUS: ABNORMAL HPF
NITRITE URINE, QUANTITATIVE: NEGATIVE
PH, URINE: 5.5 (ref 5–8)
POTASSIUM SERPL-SCNC: 4.3 MMOL/L (ref 3.5–5.1)
PROTEIN UA: NEGATIVE MG/DL
RBC URINE: ABNORMAL /HPF (ref 0–6)
SODIUM BLD-SCNC: 139 MMOL/L (ref 135–145)
SPECIFIC GRAVITY UA: <1.005 (ref 1–1.03)
SQUAMOUS EPITHELIAL: 1 /HPF
TOTAL PROTEIN: 6.8 GM/DL (ref 6.4–8.2)
TRICHOMONAS: ABNORMAL /HPF
UROBILINOGEN, URINE: NORMAL MG/DL (ref 0.2–1)
WBC UA: <1 /HPF (ref 0–5)

## 2022-05-19 PROCEDURE — 76775 US EXAM ABDO BACK WALL LIM: CPT

## 2022-05-19 PROCEDURE — 81001 URINALYSIS AUTO W/SCOPE: CPT

## 2022-05-19 PROCEDURE — 80053 COMPREHEN METABOLIC PANEL: CPT

## 2022-05-19 PROCEDURE — 36415 COLL VENOUS BLD VENIPUNCTURE: CPT

## 2022-06-06 ENCOUNTER — HOSPITAL ENCOUNTER (OUTPATIENT)
Dept: SLEEP CENTER | Age: 62
Discharge: HOME | End: 2022-06-06
Payer: MEDICAID

## 2022-06-06 DIAGNOSIS — G47.33 OSA (OBSTRUCTIVE SLEEP APNEA): ICD-10-CM

## 2022-06-06 PROCEDURE — G0398 HOME SLEEP TEST/TYPE 2 PORTA: HCPCS

## 2022-06-15 PROCEDURE — 95806 SLEEP STUDY UNATT&RESP EFFT: CPT | Performed by: INTERNAL MEDICINE

## 2022-07-20 ENCOUNTER — HOSPITAL ENCOUNTER (OUTPATIENT)
Dept: SLEEP CENTER | Age: 62
Discharge: HOME | End: 2022-07-20
Payer: MEDICAID

## 2022-07-20 DIAGNOSIS — G47.10 HYPERSOMNIA: ICD-10-CM

## 2022-07-20 DIAGNOSIS — G47.33 OSA (OBSTRUCTIVE SLEEP APNEA): ICD-10-CM

## 2022-07-20 DIAGNOSIS — E66.9 OBESITY (BMI 30-39.9): ICD-10-CM

## 2022-07-20 PROCEDURE — 99214 OFFICE O/P EST MOD 30 MIN: CPT | Performed by: INTERNAL MEDICINE

## 2022-07-20 PROCEDURE — 9990000010 HC NO CHARGE VISIT

## 2022-07-20 RX ORDER — ASPIRIN 81 MG/1
81 TABLET ORAL DAILY
COMMUNITY

## 2022-07-20 ASSESSMENT — ENCOUNTER SYMPTOMS
ABDOMINAL PAIN: 0
SHORTNESS OF BREATH: 0
EYE ITCHING: 0
BACK PAIN: 0
EYE DISCHARGE: 0
COUGH: 0
ABDOMINAL DISTENTION: 0

## 2022-07-20 NOTE — PROGRESS NOTES
mouth nightly. sertraline (ZOLOFT) 100 MG tablet Take 100 mg by mouth daily       tiZANidine (ZANAFLEX) 2 MG capsule Take 4 mg by mouth at bedtime        No current facility-administered medications for this encounter. No Known Allergies    Past Medical History:   Diagnosis Date    Arthritis     osteoarthritis    Degenerative disc disease     Fibromyalgia     Hyperlipidemia     Hypertension     Hypothyroidism 12/16/2019       Past Surgical History:   Procedure Laterality Date    BREAST BIOPSY  2000    BREAST SURGERY      biopsy    HEMORRHOID SURGERY      HYSTERECTOMY      TUBAL LIGATION         Social History     Socioeconomic History    Marital status: Single   Tobacco Use    Smoking status: Never    Smokeless tobacco: Never   Vaping Use    Vaping Use: Never used   Substance and Sexual Activity    Alcohol use: Yes    Drug use: No    Sexual activity: Yes     Partners: Male     Social Determinants of Health     Financial Resource Strain: Low Risk     Difficulty of Paying Living Expenses: Not hard at all   Food Insecurity: No Food Insecurity    Worried About Running Out of Food in the Last Year: Never true    Ran Out of Food in the Last Year: Never true       Review of Systems   Constitutional:  Positive for fatigue. HENT:  Negative for congestion and postnasal drip. Eyes:  Negative for discharge and itching. Respiratory:  Negative for cough and shortness of breath. Cardiovascular:  Negative for chest pain and leg swelling. Gastrointestinal:  Negative for abdominal distention and abdominal pain. Endocrine: Negative for cold intolerance and heat intolerance. Genitourinary:  Negative for enuresis and frequency. Musculoskeletal:  Negative for arthralgias and back pain. Allergic/Immunologic: Negative for environmental allergies and food allergies. Neurological:  Negative for light-headedness and headaches. Hematological:  Negative for adenopathy.    Psychiatric/Behavioral:  Negative for agitation and behavioral problems. Objective: There were no vitals taken for this visit. There is no height or weight on file to calculate BMI. Sleep Medicine 5/18/2022   Sitting and reading 3   Watching TV 1   Sitting, inactive in a public place (e.g. a theatre or a meeting) 2   As a passenger in a car for an hour without a break 1   Lying down to rest in the afternoon when circumstances permit 3   Sitting and talking to someone 1   Sitting quietly after a lunch without alcohol 1   In a car, while stopped for a few minutes in traffic 0   Total score 12       Radiology: None    Assessment and Plan     Problem List          Respiratory    CANDIS (obstructive sleep apnea)     She has mild CANDIS with EDS  Advised to go for the Auto CPAP  Loose weight         Relevant Orders    DME Order for CPAP as OP       Other    Obesity (BMI 30-39. 9)      Advised to loose weight with diet and exercise           Hypersomnia      She has mild CANDIS with EDS  Advised to go for the Auto CPAP  Loose weight                   No follow-ups on file. Follow-Up:    No follow-ups on file. Progress notes sent to the referring Provider    Alva Rosado is a 63 y.o. female being evaluated by a Virtual Visit (video visit) encounter to address concerns as mentioned above. A caregiver was present when appropriate. Due to this being a TeleHealth encounter (During [address] emergency), evaluation of the following organ systems was limited: Vitals/Constitutional/EENT/Resp/CV/GI//MS/Neuro/Skin/Heme-Lymph-Imm. Pursuant to the emergency declaration under the [address] [place] [address], [address] [place] [address] authority and the [employer] and [employer] Act, this Virtual Visit was conducted with patient's (and/or legal guardian's) consent, to reduce the patient's risk of exposure to COVID-19 and provide necessary medical care.   The patient (and/or legal guardian) has also been advised to contact this office for worsening conditions or problems, and seek emergency medical treatment and/or call 911 if deemed necessary. Patient identification was verified at the start of the visit: Yes    Total time spent for this encounter:     Services were provided through a video synchronous discussion virtually to substitute for in-person clinic visit. Patient and provider were located at their individual homes. --Shanita Valencia MD on 7/20/2022 at 3:26 PM    An electronic signature was used to authenticate this note.      Shanita Valencia MD MD  7/20/2022  3:26 PM

## 2022-08-04 ENCOUNTER — HOSPITAL ENCOUNTER (OUTPATIENT)
Dept: PHYSICAL THERAPY | Age: 62
Setting detail: THERAPIES SERIES
Discharge: HOME | End: 2022-08-04
Payer: MEDICAID

## 2022-08-04 PROCEDURE — 97110 THERAPEUTIC EXERCISES: CPT

## 2022-08-04 PROCEDURE — 97162 PT EVAL MOD COMPLEX 30 MIN: CPT

## 2022-08-04 ASSESSMENT — PAIN SCALES - GENERAL: PAINLEVEL_OUTOF10: 2

## 2022-08-04 NOTE — FLOWSHEET NOTE
Outpatient Physical Therapy  [place]           [x] Phone: [phone]   Fax: [phone]  [place]           [] Phone: [phone]   Fax: [phone]        Physical Therapy Daily Treatment Note  Date:  2022    Patient Name:  Sarmad Brunson    :  1960  MRN: [id number]  Restrictions/Precautions: No data recorded      Diagnosis:   Displaced comminuted fracture of shaft of left tibia, subsequent encounter for closed fracture with routine healing [S82.252D] Diagnosis: s/p IMN L tibia. Date of Injury/Surgery:   Treatment Diagnosis:  L leg pain, decreased strength, gait, balance, motion. Insurance/Certification information: Edra Copper  Referring Physician:  Kana San DO     PCP: LISA Romero NP  Next Doctor Visit:    Plan of care signed (Y/N):  n  Outcome Measure: LEFS   Visit# / total visits:  1 /  Pain level: 2-4/10   Goals:     Patient goals: walk without assistive device, decrease pain. Short term goals  Time Frame for Short term goals: 3 weeks  5 deg ankle DF  4+ or better MMT strength [place]           Long Term Goals  Time Frame for Long Term Goals: 8 weeks  I in home program.  walk 30 mins wthout assistive device, minimal pain. up/down steps with reciprocal pattern. Summary of Evaluation:  Assessment: Pt is s/p tibia fracture 22 with surgical intervention 22. She currently amb with [facility]. She has decreased balance, limited standing/walking tolerance to 10mins, decreased gait/tolerance. Stnrength and motion are also limited. PT services to improve motion, strength, balance, gait for more independent lifestyle. Subjective:  See eval         Any changes in Ambulatory Summary Sheet?   None        Objective:  See eval   COVID screening questions were asked and patient attested that there had been no contact or symptoms        Exercises: (No more than 4 columns)   Exercise/Equipment Date 22 Date Date           WARM UP         bike TM incline      TABLE      Ankle AROM Circles, pumps     Ankle resistive ex's all planes      Heel slide, knee flexion stretch To tolerance                    STANDING      Gastroc stretch      Soleus stretch       Heel raise      Toe raise      Step up/downs      Lateral step up/over                 PROPRIOCEPTION      SLS      Rocker board      Foam stand, march                  MODALITIES                      Other Therapeutic Activities/Education:        Home Exercise Program:    Access Code: [facility]  URL: [email or web address]  Date: 08/04/2022  Prepared by: Jess Leather    Exercises  Supine Bridge - 1 x daily - 7 x weekly - 3 sets - 10 reps  Supine Heel Slide with Strap - 1 x daily - 7 x weekly - 3 sets - 10 reps  Gastroc Stretch on Wall - 3 x daily - 7 x weekly - 1 sets - 3 reps - 20 hold      Manual Treatments:        Modalities:        Communication with other providers:        Assessment:  (Response towards treatment session) (Pain Rating)  Assessment: Pt is s/p tibia fracture 6/24/22 with surgical intervention 6/25/22. She currently amb with [facility]. She has decreased balance, limited standing/walking tolerance to 10mins, decreased gait/tolerance. Stnrength and motion are also limited. PT services to improve motion, strength, balance, gait for more independent lifestyle.       Plan for Next Session:        Time In / Time Out:    1115/1200           Timed Code/Total Treatment Minutes:  17/45      Next Progress Note due:        Plan of Care Interventions:  [x] Therapeutic Exercise  [] Modalities:  [x] Therapeutic Activity     [] Ultrasound  [] Estim  [x] Gait Training      [] Cervical Traction [] Lumbar Traction  [x] Neuromuscular Re-education    [] Cold/hotpack [] Iontophoresis   [x] Instruction in HEP      [x] Vasopneumatic   [] Dry Needling    [x] Manual Therapy               [] Aquatic Therapy              Electronically signed by:  Lorenza Buenrostro, PT, 8/4/2022, 11:42 AM [facility], Same Day Surgery

## 2022-08-08 NOTE — PROGRESS NOTES
Physical Therapy: Initial Evaluation    Patient: Katarzyna Razo (26 y.o. female)   Examination Date:   Plan of Care Certification Period: 2022 to        :  1960 ;    Confirmed: Yes MRN: [id number]  CSN: [id number]   Insurance: Payor: Christina Hlobrook / Plan: Melanee Blank / Product Type: *No Product type* /   Insurance ID: [id number] - (Medicaid Managed) Secondary Insurance (if applicable):    Referring Physician: Juana Raman DO     PCP: LISA Sarmiento NP Visits to Date/Visits Approved:   /      No Show/Cancelled Appts:   /       Medical Diagnosis: Displaced comminuted fracture of shaft of left tibia, subsequent encounter for closed fracture with routine healing [S82.252D] s/p IMN L tibia. Treatment Diagnosis: L leg pain, decreased strength, gait, balance, motion.      PERTINENT MEDICAL HISTORY           Medical History:     Past Medical History:   Diagnosis Date    Arthritis     osteoarthritis    Degenerative disc disease     Fibromyalgia     Hyperlipidemia     Hypertension     Hypothyroidism 2019     Surgical History:   Past Surgical History:   Procedure Laterality Date    BREAST BIOPSY      BREAST SURGERY      biopsy    HEMORRHOID SURGERY      HYSTERECTOMY      TUBAL LIGATION         Medications:   Current Outpatient Medications:     aspirin 81 MG EC tablet, Take 81 mg by mouth in the morning., Disp: , Rfl:     donepezil (ARICEPT) 10 MG tablet, Take 10 mg by mouth nightly, Disp: , Rfl:     estradiol (ESTRACE) 0.1 MG/GM vaginal cream, Place 2 g vaginally Twice a Week, Disp: , Rfl:     fluticasone (FLONASE) 50 MCG/ACT nasal spray, as needed , Disp: , Rfl:     SM MUCUS RELIEF 600 MG extended release tablet, TAKE 1 TABLET EVERY 12 HOURS AS NEEDED FOR cough AND congestion, Disp: , Rfl:     hydrOXYzine (ATARAX) 25 MG tablet, as needed , Disp: , Rfl:     indomethacin (INDOCIN) 50 MG capsule, Take 1 capsule by mouth 2 times daily as needed for Pain (Headache), Disp: 12 capsule, Rfl: 0    ondansetron (ZOFRAN-ODT) 4 MG disintegrating tablet, Take 1 tablet by mouth 3 times daily as needed for Nausea or Vomiting (Headache), Disp: 21 tablet, Rfl: 0    alendronate (FOSAMAX) 70 MG tablet, Take 70 mg by mouth every 7 days , Disp: , Rfl:     busPIRone (BUSPAR) 30 MG tablet, Take 30 mg by mouth 2 times daily , Disp: , Rfl:     enalapril (VASOTEC) 5 MG tablet, Take 5 mg by mouth daily , Disp: , Rfl:     vitamin B-12 (CYANOCOBALAMIN) 1000 MCG tablet, Take 1,000 mcg by mouth daily, Disp: , Rfl:     OXYBUTYNIN CHLORIDE PO, Take 10 mg by mouth daily , Disp: , Rfl:     cetirizine (ZYRTEC) 10 MG tablet, Take 10 mg by mouth daily, Disp: , Rfl:     levothyroxine (SYNTHROID) 50 MCG tablet, Take 50 mcg by mouth Daily, Disp: , Rfl:     gabapentin (NEURONTIN) 400 MG capsule, Take 600 mg by mouth 2 times daily. , Disp: , Rfl:     Cholecalciferol (VITAMIN D3) 5000 UNITS TABS, Take by mouth daily , Disp: , Rfl:     rOPINIRole (REQUIP) 0.5 MG tablet, Take 0.5 mg by mouth once.  , Disp: , Rfl:     trazodone (DESYREL) 50 MG tablet, Take 100 mg by mouth nightly , Disp: , Rfl:     simvastatin (ZOCOR) 20 MG tablet, Take 20 mg by mouth nightly.  , Disp: , Rfl:     sertraline (ZOLOFT) 100 MG tablet, Take 100 mg by mouth daily , Disp: , Rfl:     tiZANidine (ZANAFLEX) 2 MG capsule, Take 4 mg by mouth at bedtime , Disp: , Rfl:   Allergies: Patient has no known allergies. SUBJECTIVE EXAMINATION      ,           Subjective History:    Subjective: L leg pain, difficuty walking, balancing. Additional Pertinent Hx (if applicable): 6/77/87 L tibia fx, ORIF 6/25/22. Currently amb with [facility].  has pain and swelling foot and leg. denies numb/tingling sensations.           Learning/Language: Learning  Does the patient/guardian have any barriers to learning?: No barriers  Will there be a co-learner?: No  What is the preferred language of the patient/guardian?: English  Is an  required?: No  How does the patient/guardian prefer to learn new concepts?: Listening, Reading, Demonstration, Pictures/Videos     Pain Screening   Pain Screening  Patient Currently in Pain: Yes  Pain Assessment: 0-10  Pain Level: 2  Worst Pain Level: 4    Functional Status         Social History:  Social History  Lives With: Friend(s)  Type of Home: House  Home Layout: One level  Bathroom Shower/Tub: Tub/Shower unit, Shower chair with back  Bathroom Toilet: Standard    Occupation/Interests:  Occupation: On disability  Type of Occupation: disabled due to back    Prior Level of Function:    Independent        Current Level of Function:  amb FWW wbat      ADL Assistance: Independent  Homemaking Assistance: Independent  Homemaking Responsibilities: Yes  Ambulation Assistance: Independent  Transfer Assistance: Independent  Active : Yes    OBJECTIVE EXAMINATION   Restrictions:             Review of Systems:  Vision: Within Functional Limits  Hearing: Within functional limits  Overall Orientation Status: Within Functional Limits    VBI Screening / Lumbar Screening:        Regional Screen:   Knee Screen: L 2 hyper ext  105 flext, R 15 hyper ext. 115 flex.   increased valgus B     Observations:  General Observations  Description: mild edema L leg, ankle, foot  Observations  General Observations WB: wbat, pronated, planus feet    Palpation:        Ambulation/Gait (if applicable):  Ambulation  Surface: level tile, carpet  Device: Rolling Walker  Assistance: Independent    Balance Screen:       Left AROM  Right AROM         AROM LLE (degrees)  L Ankle Dorsiflexion 0-20: lacking 5 deg  L Ankle Plantar Flexion 0-45: 27deg  L Ankle Forefoot Inversion 0-40: 25  L Ankle Forefoot Eversion 0-20: 10    AROM RLE (degrees)  R Ankle Dorsiflexion 0-20: 5 deg DF  R Ankle Plantar Flexion 0-45: 35deg     Left PROM  Right PROM         PROM LLE (degrees)  L Ankle Forefoot Inversion 0-40: 30  L Ankle Forefoot Eversion 0-20: 20 Left Strength  Right Strength         Strength LLE  L Ankle Dorsiflexion: 5/5  L Ankle Plantar Flexion: 4/5  L Ankle Inversion: 4-/5  L Ankle Eversion: 4-/5                ASSESSMENT     Impression: Assessment: Pt is s/p tibia fracture 6/24/22 with surgical intervention 6/25/22. She currently amb with [facility] WBAT. She has decreased balance, limited standing/walking tolerance to 10mins, decreased gait/tolerance. Stnrength and motion are also limited. PT services to improve motion, strength, balance, gait for more independent lifestyle. Statement of Medical Necessity: Physical Therapy is both indicated and medically necessary as outlined in the POC to increase the likelihood of meeting the functionally related goals stated below. Patient's Activity Tolerance:        Patient's rehabilitation potential/prognosis is considered to be: Good    Factors which may impact rehabilitation potential include:          GOALS   Patient Goal(s): walk without assistive device, decrease pain. Short Term Goals Completed by 3 weeks Goal Status   5 deg ankle DF     4+ or better MMT strength Lankle                                                         Long Term Goals Completed by 8 weeks Goal Status   I in home program.     walk 30 mins wthout assistive device, minimal pain. up/down steps with reciprocal pattern. SLS with minimal pain, difficulty 8 sec or longer                                              TREATMENT PLAN       Requires PT Follow-Up: Yes    Pt. actively involved in establishing Plan of Care and Goals: Yes  Patient/ Caregiver education and instruction:                   Therapist Signature: Maria Eugenia Waldron, PT    Date: 8/9/4520     I certify that the above Therapy Services are being furnished while the patient is under my care. I agree with the treatment plan and certify that this therapy is necessary.       [de-identified] Signature:  ___________________________   Date:_______ Jessika Roman DO        Physician Comments: _______________________________________________    Please sign and return to   [facility]. Please fax to the location listed below.  [place] for this referral!    [address] [facility] [address], [address] [address]  Dept: [phone]  Loc: [phone]       POC NOTE

## 2022-08-08 NOTE — PLAN OF CARE
Outpatient Physical Therapy                  [x] Phone: [phone]   Fax: [phone]    Pediatric Therapy                                    [] Phone: [phone]   Fax: [phone]  Pediatric [facility]                                      [] Phone: [phone]   Fax: [phone]      To:  Dr. Taran Mo    From: Stas Alves, PT, PT     Patient: Aura Cook       : 1960  Diagnosis: s/p IMN L tibia. Treatment Diagnosis: L leg pain, decreased strength, gait, balance, motion. Date: 2022    Physical Therapy Certification/Re-Certification Form  Dear Dr. Taran Mo  The following patient has been evaluated for physical therapy services and for therapy to continue, Please review the attached evaluation and/or summary of the patient's plan of care, and verify that you agree therapy should continue by signing the attached document and sending it back to our office. Patient is a  62 yo female who presents s/p L tibia fx  which impacts on adls;patient's goal is to walk, improve strength, motion, balance ;patient reports that stiffness, pain, weaknss  limits activities including standing walking, balancing; PT to address patient's goals, impairments and activity limitations with skilled interventions checked in plan of care;patient's level of function prior to onset of injury was independent; did not observe any barriers to learning during PT eval; learning preferences include demonstration, practice, and handouts; patient expressed understanding of HEP; patient appears to be motivated to participate in an active PT program and to be compliant with HEP expectations;patient assisted in developing treatment plan and goals; no DME is currently being used;      Current functional level (based on LEFS )   :21    Assessment:  Assessment: Pt is s/p tibia fracture 22 with surgical intervention 22. She currently amb with [facility] WBAT.   She has decreased balance, limited standing/walking tolerance to 10mins, decreased gait/tolerance. Stnrength and motion are also limited. PT services to improve motion, strength, balance, gait for more independent lifestyle. Plan of Care/Treatment to date:  [x] Therapeutic Exercise    [] Aquatics:  [x] Therapeutic Activity    [] Ultrasound  [] Elec Stimulation  [x] Gait Training     [] Cervical Traction [] Lumbar Traction  [x] Neuromuscular Re-education [] Cold/hotpack [] Iontophoresis   [x] Instruction in HEP       [x] Manual Therapy     [] vasopneumatic            [] Self care home management        []Dry needling trigger point point/pain management          Frequency/Duration:  # Days per week: [] 1 day # Weeks: [] 1 week [] 5 weeks     [x] 2 days   [] 2 weeks [] 6 weeks     [] 3 days   [] 3 weeks [] 7 weeks     [] 4 days   [] 4 weeks [x] 8 weeks    Rehab Potential/Progress: [] Excellent [x] Good [] Fair  [] Poor     Goals:    Short Term Goals  Time Frame for Short term goals: 3 weeks  Short term goal 1: 5 deg ankle DF  Short term goal 2: 4+ or better MMT strength Lankle  Long Term Goals  Time Frame for Long term goals : 8 weeks  Long term goal 1: I in home program.  Long term goal 2: walk 30 mins wthout assistive device, minimal pain. Long term goal 3: up/down steps with reciprocal pattern. Long term goal 4: SLS with minimal pain, difficulty 8 sec or longer  Electronically signed by:  Brittani Ahn PT, PT, 8/8/2022, 7:36 AM              If you have any questions or concerns, please don't hesitate to call.   Thank you for your referral.      Physician Signature:_________________Date:____________Time: ________  By signing above, therapists plan is approved by physician

## 2022-08-13 ENCOUNTER — HOSPITAL ENCOUNTER (OUTPATIENT)
Dept: PHYSICAL THERAPY | Age: 62
Setting detail: THERAPIES SERIES
Discharge: HOME | End: 2022-08-13
Payer: MEDICAID

## 2022-08-13 PROCEDURE — 97110 THERAPEUTIC EXERCISES: CPT

## 2022-08-13 PROCEDURE — 97112 NEUROMUSCULAR REEDUCATION: CPT

## 2022-08-13 NOTE — FLOWSHEET NOTE
None        Objective:    COVID screening questions were asked and patient attested that there had been no contact or symptoms  Difficulty isolating knee and ankle movement. Exercises: (No more than 4 columns)   Exercise/Equipment Date 8/4/22 8/13/2022 # 2 Date           WARM UP         bike  Nu-step L4x 5'     TM incline      TABLE      Ankle AROM Circles, pumps     Ankle resistive ex's all planes  GTB 10x2 ea dir     Heel slide, knee flexion stretch To tolerance     bridge  10x2    SAQ  10x2 5\"       SLR   2# 10x2    STANDING      Gastroc stretch      Soleus stretch       Heel raise  10x2    Toe raise  10x2     Step up/downs  4\" 10x2 ant step up        Lateral step up/over  4# 10x ea LE               PROPRIOCEPTION      SLS  30\" 2 w/ UE     Rocker board  A/P 30\" x2 w/ UE support    Foam stand, march  10x2 w/ UE support                MODALITIES                      Other Therapeutic Activities/Education:        Home Exercise Program:    Access Code: [facility]  URL: [email or web address]  Date: 08/04/2022  Prepared by: Edgardo Soto    Exercises  Supine Bridge - 1 x daily - 7 x weekly - 3 sets - 10 reps  Supine Heel Slide with Strap - 1 x daily - 7 x weekly - 3 sets - 10 reps  Gastroc Stretch on Wall - 3 x daily - 7 x weekly - 1 sets - 3 reps - 20 hold      Manual Treatments:        Modalities:        Communication with other providers:        Assessment:  (Response towards treatment session) (Pain Rating)Pt tolerated treatment fairly well. Pt was able to get more ROM and was able to increase activity in the gym. Pt rated pain at 2/10 after treatment. Pt will continue to benefit from more strengthening, ROM, and balance.           Plan for Next Session:        Time In / Time Out:   0930/ 1020           Timed Code/Total Treatment Minutes:  50'/50' 2 TE ( 30') 1 neuro (20')       Next Progress Note due:        Plan of Care Interventions:  [x] Therapeutic Exercise  [] Modalities:  [x] Therapeutic Activity

## 2022-08-17 ENCOUNTER — HOSPITAL ENCOUNTER (OUTPATIENT)
Dept: PHYSICAL THERAPY | Age: 62
Setting detail: THERAPIES SERIES
Discharge: HOME | End: 2022-08-17
Payer: MEDICAID

## 2022-08-17 PROCEDURE — 97530 THERAPEUTIC ACTIVITIES: CPT

## 2022-08-17 PROCEDURE — 97112 NEUROMUSCULAR REEDUCATION: CPT

## 2022-08-17 PROCEDURE — 97110 THERAPEUTIC EXERCISES: CPT

## 2022-08-17 NOTE — FLOWSHEET NOTE
Outpatient Physical Therapy  [place]           [x] Phone: [phone]   Fax: [phone]  [facility]           [] Phone: [phone]   Fax: [phone]        Physical Therapy Daily Treatment Note  Date:  2022    Patient Name:  Franci Bautista    :  1960  MRN: [id number]  Restrictions/Precautions: No data recorded      Diagnosis:   Displaced comminuted fracture of shaft of left tibia, subsequent encounter for closed fracture with routine healing [S82.252D]    Date of Injury/Surgery:   Treatment Diagnosis:  L leg pain, decreased strength, gait, balance, motion. Insurance/Certification information:    Referring Physician:  Riaz Ovalle DO     PCP: LISA Laura NP  Next Doctor Visit:  2022  Plan of care signed (Y/N):  n  Outcome Measure: LEFS   Visit# / total visits:  3 /16  Pain level: 3/10 ache   Goals:     Patient goals: walk without assistive device, decrease pain. Short term goals  Time Frame for Short term goals: 3 weeks  5 deg ankle DF  4+ or better MMT strength Lankle           Long Term Goals  Time Frame for Long Term Goals: 8 weeks  I in home program.  walk 30 mins wthout assistive device, minimal pain. up/down steps with reciprocal pattern. Summary of Evaluation:  Assessment: Pt is s/p tibia fracture 22 with surgical intervention 22. She currently amb with [facility] WBAT. She has decreased balance, limited standing/walking tolerance to 10mins, decreased gait/tolerance. Strength and motion are also limited. PT services to improve motion, strength, balance, gait for more independent lifestyle. Subjective:  Pt rated pain at 3/10 today. Compliance with HEP. Any changes in Ambulatory Summary Sheet?   None        Objective:    COVID screening questions were asked and patient attested that there had been no contact or symptoms  At arrival ambulation with SPC lateral sway, WBOS  Reports fear of falling    Improve isolating knee and ankle movement. Improved stability on wobble board and airex today  Challenged with step ups with UE support needed        Exercises: (No more than 4 columns)   Exercise/Equipment Date 8/4/22 8/13/2022 # 2 8/17/22 #3           WARM UP         bike  Nu-step L4x 5'  Nu-step L4x 5'   beg   TM incline   Inclined 5' 1 sp  end   TABLE      Ankle AROM Circles, pumps     Ankle resistive ex's all planes  GTB 10x2 ea dir  Ev/inv GTB  DF/PF BlueTB  2 x 10 ea. Heel slide, knee flexion stretch To tolerance     bridge  10x2 10x2   SAQ  10x2 5\" 2# 10x2 5\"      SLR   2# 10x2 2# 10x2   STANDING      Gastroc stretch      Soleus stretch       Heel raise  10x2 10x2   Toe raise  10x2  10x2   Step up/downs  4\" 10x2 ant step up     6\" 10x2 ant step up   Lateral step up/over  4# 10x ea LE 6\" 10x2 lat   step up   UE PRN    Gastroc stretch       30s x 2   PROPRIOCEPTION      SLS  30\" 2 w/ UE  30\" x 2 w/ UE    Rocker board  A/P 30\" x2 w/ UE support A/P, M/L 30\" x2 ea. w/ UE support PRN // bars   Foam stand, march  10x2 w/ UE support 10x2 w/ UE support PRN               MODALITIES                      Other Therapeutic Activities/Education:        Home Exercise Program:    Access Code: [facility]  URL: [email or web address]  Date: 08/04/2022  Prepared by: Jb Mayo    Exercises  Supine Bridge - 1 x daily - 7 x weekly - 3 sets - 10 reps  Supine Heel Slide with Strap - 1 x daily - 7 x weekly - 3 sets - 10 reps  Gastroc Stretch on Wall - 3 x daily - 7 x weekly - 1 sets - 3 reps - 20 hold    8/17/22 SLR, LAQ, SLS, Heel/toe raises, marches, ankle all direction w TB [email or web address] Access Code: [email or web address]    Manual Treatments:  x      Modalities:  x      Communication with other providers:        Assessment:  (Response towards treatment session) (Pain Rating)    Pt tolerated treatment well with new exercises. Updated HEP. Gait deficits with some fear of falling but improved strength and balance since last tx.  Pt would continue to benefit from skilled therapy interventions to address remaining impairments, improve mobility and strength and progress toward goal completion while reducing risk for re-injury or further decline.    2/10 pain       Plan for Next Session: strengthening ankle, ankle strategy, calf flexibility and gait       Time In / Time Out:   1100/ 1159      Timed Code/Total Treatment Minutes:  59'/59' 2 TE ( 30') 1 neuro (20') 1 TA      Next Progress Note due:        Plan of Care Interventions:  [x] Therapeutic Exercise  [] Modalities:  [x] Therapeutic Activity     [] Ultrasound  [] Estim  [x] Gait Training      [] Cervical Traction [] Lumbar Traction  [x] Neuromuscular Re-education    [] Cold/hotpack [] Iontophoresis   [x] Instruction in HEP      [x] Vasopneumatic   [] Dry Needling    [x] Manual Therapy               [] Aquatic Therapy              Electronically signed by:  Kamar Haney PTA, CLT 8/17/2022, 11:01 AM

## 2022-08-19 ENCOUNTER — HOSPITAL ENCOUNTER (OUTPATIENT)
Dept: PHYSICAL THERAPY | Age: 62
Setting detail: THERAPIES SERIES
Discharge: HOME | End: 2022-08-19
Payer: MEDICAID

## 2022-08-19 PROCEDURE — 97112 NEUROMUSCULAR REEDUCATION: CPT

## 2022-08-19 PROCEDURE — 97530 THERAPEUTIC ACTIVITIES: CPT

## 2022-08-19 PROCEDURE — 97110 THERAPEUTIC EXERCISES: CPT

## 2022-08-19 PROCEDURE — 97016 VASOPNEUMATIC DEVICE THERAPY: CPT

## 2022-08-19 NOTE — FLOWSHEET NOTE
Outpatient Physical Therapy  [place]           [x] Phone: [phone]   Fax: [phone]  [facility]           [] Phone: [phone]   Fax: [phone]        Physical Therapy Daily Treatment Note  Date:  2022    Patient Name:  Bryanna Pinto    :  1960  MRN: [id number]  Restrictions/Precautions: No data recorded      Diagnosis:   Displaced comminuted fracture of shaft of left tibia, subsequent encounter for closed fracture with routine healing [S82.736G]    Date of Injury/Surgery:   Treatment Diagnosis:  L leg pain, decreased strength, gait, balance, motion. Insurance/Certification information:    Referring Physician:  Erin Mayorga DO     PCP: LISA Ackerman NP  Next Doctor Visit:  2022  Plan of care signed (Y/N):  n  Outcome Measure: LEFS   Visit# / total visits:    Pain level: 2/10 ache upper chin  Goals:     Patient goals: walk without assistive device, decrease pain. Short term goals  Time Frame for Short term goals: 3 weeks  5 deg ankle DF  4+ or better MMT strength Lankle           Long Term Goals  Time Frame for Long Term Goals: 8 weeks  I in home program.  walk 30 mins wthout assistive device, minimal pain. up/down steps with reciprocal pattern. Summary of Evaluation:  Assessment: Pt is s/p tibia fracture 22 with surgical intervention 22. She currently amb with [facility] WBAT. She has decreased balance, limited standing/walking tolerance to 10mins, decreased gait/tolerance. Strength and motion are also limited. PT services to improve motion, strength, balance, gait for more independent lifestyle. Subjective:  Pt rated pain at 2/10 today. Didn't do her HEP because she had to take care of her grand kids 2x 3yo an d1 4yo. She was exhausted. Any changes in Ambulatory Summary Sheet?   None      Objective:    COVID screening questions were asked and patient attested that there had been no contact or symptoms  At arrival ambulation with SPC lateral sway, WBOS. Improved without AD and cues. Naturally has a   Reports fear of falling    Improve isolating knee and ankle movement. Improved stability on wobble board and airex today  Challenged with step ups with UE support needed  Decreased L LE WB with STS  Anterior tib swelling noted at arrival and throughout. No change    Exercises: (No more than 4 columns)   Exercise/Equipment Date 8/4/22 8/13/2022 # 2 8/17/22 #3 8/19/22            WARM UP          bike  Nu-step L4x 5'  Nu-step L4x 5'   beg Bike Lv 1 beg   TM incline   Inclined 5' 1 sp  end    TABLE       Ankle AROM Circles, pumps      Ankle resistive ex's all planes  GTB 10x2 ea dir  Ev/inv GTB  DF/PF BlueTB  2 x 10 ea. Heel slide, knee flexion stretch To tolerance      bridge  10x2 10x2    SAQ  10x2 5\" 2# 10x2 5\"    SLR   2# 10x2 2# 10x2    STANDING       Gastroc stretch       Soleus stretch        Heel raise  10x2 10x2 10x2   Toe raise  10x2  10x2 10x2   Step up/downs  4\" 10x2 ant step up     6\" 10x2 ant step up 6\" 10x2 ant step up   Lateral step up/over  4# 10x ea LE 6\" 10x2 lat   step up   UE PRN 6\" 10x2 lat   step up   UE PRN  Cued for NBOS to avoid increase lateral stress that causes pain. STS    X 10 staggered L back    Gastroc stretch       30s x 2 30s x 2   Gait ambulation mechanics     50 ft x 4 laps no AD  Improved quality with cues. PROPRIOCEPTION       SLS  30\" 2 w/ UE  30\" x 2 w/ UE  30 s w UE support   Rocker board  A/P 30\" x2 w/ UE support A/P, M/L 30\" x2 ea. w/ UE support PRN // bars A/P, M/L 30\" x2 ea. w/ UE support PRN // bars   Foam stand, march  10x2 w/ UE support 10x2 w/ UE support PRN 10x2 w/ UE support PRN   tandem    30 s           MODALITIES           Vaso 10'              Other Therapeutic Activities/Education:        Home Exercise Program:    Access Code: [facility]  URL: [email or web address]  Date: 08/04/2022  Prepared by: Barbar Serge    Exercises  Supine Bridge - 1 x daily - 7 x weekly - 3 sets - 10 reps  Supine Heel Slide with Strap - 1 x daily - 7 x weekly - 3 sets - 10 reps  Gastroc Stretch on Wall - 3 x daily - 7 x weekly - 1 sets - 3 reps - 20 hold    8/17/22 SLR, LAQ, SLS, Heel/toe raises, marches, ankle all direction w TB [email or web address] Access Code: [email or web address]    Manual Treatments:  x      Modalities: Vaso LS knee 10'      Communication with other providers:        Assessment:  (Response towards treatment session) (Pain Rating)    Pt tolerated treatment well . Practices ambulation without AD. Challenged with lateral WS and fair gait quality. Can self-correct but fatigues. Cued to engage PF more. Some min pain at surgery site with prolonged standing activities. Pt would continue to benefit from skilled therapy interventions to address remaining impairments, improve mobility and strength and progress toward goal completion while reducing risk for re-injury or further decline.    2/10 pain       Plan for Next Session: strengthening ankle, ankle strategy, calf flexibility and gait       Time In / Time Out:   1100/ 1159      Timed Code/Total Treatment Minutes:  49'/59' 1 TE  1 neuro 1 TA 1 vaso      Next Progress Note due:        Plan of Care Interventions:  [x] Therapeutic Exercise  [] Modalities:  [x] Therapeutic Activity     [] Ultrasound  [] Estim  [x] Gait Training      [] Cervical Traction [] Lumbar Traction  [x] Neuromuscular Re-education    [] Cold/hotpack [] Iontophoresis   [x] Instruction in HEP      [x] Vasopneumatic   [] Dry Needling    [x] Manual Therapy               [] Aquatic Therapy              Electronically signed by:  Rita Walters PTA, CLT 8/19/2022, 11:10 AM

## 2022-08-24 ENCOUNTER — HOSPITAL ENCOUNTER (OUTPATIENT)
Dept: PHYSICAL THERAPY | Age: 62
Setting detail: THERAPIES SERIES
Discharge: HOME | End: 2022-08-24
Payer: MEDICAID

## 2022-08-24 PROCEDURE — 97112 NEUROMUSCULAR REEDUCATION: CPT

## 2022-08-24 PROCEDURE — 97530 THERAPEUTIC ACTIVITIES: CPT

## 2022-08-24 PROCEDURE — 97110 THERAPEUTIC EXERCISES: CPT

## 2022-08-24 PROCEDURE — 97016 VASOPNEUMATIC DEVICE THERAPY: CPT

## 2022-08-24 NOTE — FLOWSHEET NOTE
Outpatient Physical Therapy  [place]           [x] Phone: [phone]   Fax: [phone]  [facility]           [] Phone: [phone]   Fax: [phone]        Physical Therapy Daily Treatment Note  Date:  2022    Patient Name:  Sarmad Brunson    :  1960  MRN: [id number]  Restrictions/Precautions: No data recorded      Diagnosis:   Displaced comminuted fracture of shaft of left tibia, subsequent encounter for closed fracture with routine healing [S82.252D]    Date of Injury/Surgery: 22 L tibia fx, ORIF 22  Treatment Diagnosis:  L leg pain, decreased strength, gait, balance, motion. Insurance/Certification information:    Referring Physician:  Kana San DO     PCP: LISA Romero NP  Next Doctor Visit:  2022  Plan of care signed (Y/N):  n  Outcome Measure: LEFS   Visit# / total visits:    Pain level: 2/10 ache upper chin  Goals:     Patient goals: walk without assistive device, decrease pain. Short term goals  Time Frame for Short term goals: 3 weeks  5 deg ankle DF  4+ or better MMT strength [place]           Long Term Goals  Time Frame for Long Term Goals: 8 weeks  I in home program.  walk 30 mins without assistive device, minimal pain. up/down steps with reciprocal pattern. Summary of Evaluation:  Assessment: Pt is s/p tibia fracture 22 with surgical intervention 22. She currently amb with FWW WBAT. She has decreased balance, limited standing/walking tolerance to 10mins, decreased gait/tolerance. Strength and motion are also limited. PT services to improve motion, strength, balance, gait for more independent lifestyle. Subjective:  Pt rated pain at 2/10 today. No pain when she woke up. Compliance with HEP. She has been walking without her cane for most of the town exept for a fair and one other time. Her ankle was a bit sore but it went away. No dizziness , falls, or trips. Any changes in Ambulatory Summary Sheet? None      Objective:    COVID screening questions were asked and patient attested that there had been no contact or symptoms      At arrival ambulation no AD lateral sway, WBOS. Improved without AD and cues. Naturally has a   Reports 15 min walking tolerance  Reports taking stairs reciprocally and it going fine      Improved stability on wobble board and airex today. Heavy UE reliance with step up. Some L LE ER compensation  Challenged with step ups with UE support needed  Decreased L LE WB with CKC      Exercises: (No more than 4 columns)   Exercise/Equipment 8/13/2022 # 2 8/17/22 #3 8/19/22 8/24/22 #5            WARM UP          bike Nu-step L4x 5'  Nu-step L4x 5'   beg Bike Lv 1 beg Bike Lv 1 beg 5'   TM incline  Inclined 5' 1 sp  end     TABLE       Ankle AROM       Ankle resistive ex's all planes GTB 10x2 ea dir  Ev/inv GTB  DF/PF BlueTB  2 x 10 ea. Heel slide, knee flexion stretch       bridge 10x2 10x2     SAQ 10x2 5\" 2# 10x2 5\"     SLR  2# 10x2 2# 10x2     STANDING       Gastroc stretch       Soleus stretch        Heel raise 10x2 10x2 10x2 10x2  On 1/2 foam roll   Toe raise 10x2  10x2 10x2 10 x 2   Step up/downs 4\" 10x2 ant step up     6\" 10x2 ant step up 6\" 10x2 ant step up 6\" 10x2 ant step up   Lateral step up/over 4# 10x ea LE 6\" 10x2 lat   step up   UE PRN 6\" 10x2 lat   step up   UE PRN  Cued for NBOS to avoid increase lateral stress that causes pain. 6\" 10x2 lat   step up 1 UE    STS   X 10 staggered L back X 10 staggered L back adduction noted  X 10 staggered L back w RTB    Gastroc stretch      30s x 2 30s x 2 30s x 2   Gait ambulation mechanics    50 ft x 4 laps no AD  Improved quality with cues. PROPRIOCEPTION       SLS 30\" 2 w/ UE  30\" x 2 w/ UE  30 s w UE support 30 s x 2 w 1UE  Unable w/o UE  Cued to avoid Locking knee out   Rocker board A/P 30\" x2 w/ UE support A/P, M/L 30\" x2 ea. w/ UE support PRN // bars A/P, M/L 30\" x2 ea. w/ UE support PRN // bars A/P, M/L 1' ea.  w/ UE support PRN in front of treamill bar   Foam stand, march 10x2 w/ UE support 10x2 w/ UE support PRN 10x2 w/ UE support PRN 10x2 w/ UE support PRN   tandem   30 s  30 s ea. Floor            MODALITIES          Vaso 10' Vaso 10'              Other Therapeutic Activities/Education:        Home Exercise Program:    Access Code: [facility]  URL: [email or web address]  Date: 08/04/2022  Prepared by: Jess Leather    Exercises  Supine Bridge - 1 x daily - 7 x weekly - 3 sets - 10 reps  Supine Heel Slide with Strap - 1 x daily - 7 x weekly - 3 sets - 10 reps  Gastroc Stretch on Wall - 3 x daily - 7 x weekly - 1 sets - 3 reps - 20 hold    8/17/22 SLR, LAQ, SLS, Heel/toe raises, marches, ankle all direction w TB [email or web address] Access Code: [email or web address]    Manual Treatments:  x      Modalities: Vaso LS knee 10'      Communication with other providers:        Assessment:  (Response towards treatment session) (Pain Rating)    Pt tolerated treatment well and reports no more stiffness in her ankle. Some improvement with stability with difficulty with lateral steps and some report of intermittent sharp pain. Overall L LE weakness noted. Pt would continue to benefit from skilled therapy interventions to address remaining impairments, improve mobility and strength and progress toward goal completion while reducing risk for re-injury or further decline.    0/10 pain post tx/ vaso       Plan for Next Session: strengthening ankle, ankle strategy, calf flexibility and gait       Time In / Time Out:   1100/ 1155      Timed Code/Total Treatment Minutes:  45'/55' 1 TE  1 neuro 1 TA 1 vaso      Next Progress Note due:        Plan of Care Interventions:  [x] Therapeutic Exercise  [] Modalities:  [x] Therapeutic Activity     [] Ultrasound  [] Estim  [x] Gait Training      [] Cervical Traction [] Lumbar Traction  [x] Neuromuscular Re-education    [] Cold/hotpack [] Iontophoresis   [x] Instruction in HEP      [x] Vasopneumatic   [] Dry Needling    [x] Manual Therapy               [] Aquatic Therapy              Electronically signed by:  Gogo Lepe PTA, ANNALISE 8/24/2022, 9:25 AM

## 2022-08-26 ENCOUNTER — HOSPITAL ENCOUNTER (OUTPATIENT)
Dept: PHYSICAL THERAPY | Age: 62
Setting detail: THERAPIES SERIES
Discharge: HOME | End: 2022-08-26
Payer: MEDICAID

## 2022-08-26 PROCEDURE — 97112 NEUROMUSCULAR REEDUCATION: CPT

## 2022-08-26 PROCEDURE — 97110 THERAPEUTIC EXERCISES: CPT

## 2022-08-26 PROCEDURE — 97140 MANUAL THERAPY 1/> REGIONS: CPT

## 2022-08-26 PROCEDURE — 97016 VASOPNEUMATIC DEVICE THERAPY: CPT

## 2022-08-26 NOTE — FLOWSHEET NOTE
Summary Sheet? None      Objective:    COVID screening questions were asked and patient attested that there had been no contact or symptoms      Ambulated with no AD. Pt responded well to manual treatment today. Pt only needed PRN UE assist for step ups  Pt was able to perform tandem stance without UE support, but did need 1 UE for wobble board  Demo'd and used chair lift  Exercises: (No more than 4 columns) Chair lift  Exercise/Equipment 8/13/2022 # 2 8/17/22 #3 8/19/22 8/24/22 #5 8/26/2022 #6             WARM UP           bike Nu-step L4x 5'  Nu-step L4x 5'   beg Bike Lv 1 beg Bike Lv 1 beg 5' Bike x 5'    TM incline  Inclined 5' 1 sp  end      TABLE        Ankle AROM        Ankle resistive ex's all planes GTB 10x2 ea dir  Ev/inv GTB  DF/PF BlueTB  2 x 10 ea. Heel slide, knee flexion stretch        bridge 10x2 10x2      SAQ 10x2 5\" 2# 10x2 5\"      SLR  2# 10x2 2# 10x2      STANDING        Gastroc stretch        Soleus stretch         Heel raise 10x2 10x2 10x2 10x2  On 1/2 foam roll 10x2  On 1/2 foam roll   Toe raise 10x2  10x2 10x2 10 x 2    Step up/downs 4\" 10x2 ant step up     6\" 10x2 ant step up 6\" 10x2 ant step up 6\" 10x2 ant step up 6\" 10x2 ant step up PRN  UE   Lateral step up/over 4# 10x ea LE 6\" 10x2 lat   step up   UE PRN 6\" 10x2 lat   step up   UE PRN  Cued for NBOS to avoid increase lateral stress that causes pain. 6\" 10x2 lat   step up 1 UE  6\" 10x2 lat   step up PRN UE   STS   X 10 staggered L back X 10 staggered L back adduction noted  X 10 staggered L back w RTB Staggered step L behind 10x2    Gastroc stretch      30s x 2 30s x 2 30s x 2 1/2 roll 1'    Gait ambulation mechanics    50 ft x 4 laps no AD  Improved quality with cues.      PROPRIOCEPTION        SLS 30\" 2 w/ UE  30\" x 2 w/ UE  30 s w UE support 30 s x 2 w 1UE  Unable w/o UE  Cued to avoid Locking knee out    Rocker board A/P 30\" x2 w/ UE support A/P, M/L 30\" x2 ea. w/ UE support PRN // bars A/P, M/L 30\" x2 ea. w/ UE support PRN // bars A/P, M/L 1' ea. w/ UE support PRN in front of treamill bar A/P, M/L 1' ea. w/ UE support PRN in front of treamill bar   Foam stand, march 10x2 w/ UE support 10x2 w/ UE support PRN 10x2 w/ UE support PRN 10x2 w/ UE support PRN 10x2 w/ UE support PRN   tandem   30 s  30 s ea. Floor   30\" ea foot fwd (floor)           MODALITIES           Vaso 10' Vaso 10' Vaso x 10'                Other Therapeutic Activities/Education:        Home Exercise Program:    Access Code: [facility]  URL: [email or web address]  Date: 08/04/2022  Prepared by: Kierra Engel    Exercises  Supine Bridge - 1 x daily - 7 x weekly - 3 sets - 10 reps  Supine Heel Slide with Strap - 1 x daily - 7 x weekly - 3 sets - 10 reps  Gastroc Stretch on Wall - 3 x daily - 7 x weekly - 1 sets - 3 reps - 20 hold    8/17/22 SLR, LAQ, SLS, Heel/toe raises, marches, ankle all direction w TB [email or web address] Access Code: [email or web address]    Manual Treatments:  pat mobs, STM to quads, and hamstrings,and some CFM to incision sites. Modalities: Vaso L knee 10'      Communication with other providers:        Assessment:  (Response towards treatment session) (Pain Rating) Pt tolerated treatment fairly well. Pt responded well to the manual treatment. Pt reported that balance activities were easier after manual treatment. Pt rated pain at 1/10 after vaso. Pt will continue to benefit from more strengthening and balance.          Plan for Next Session: strengthening ankle, ankle strategy, calf flexibility and gait       Time In / Time Out:   1104/1204      Timed Code/Total Treatment Minutes: 50'/ 61' 1 man ( 10') 1 vaso (10') 1 TE (20') 1 neuro (20')       Next Progress Note due:        Plan of Care Interventions:  [x] Therapeutic Exercise  [] Modalities:  [x] Therapeutic Activity     [] Ultrasound  [] Estim  [x] Gait Training      [] Cervical Traction [] Lumbar Traction  [x] Neuromuscular Re-education    [] Cold/hotpack [] Iontophoresis   [x] Instruction in HEP [x] Vasopneumatic   [] Dry Needling    [x] Manual Therapy               [] Aquatic Therapy              Electronically signed by:  Delia Hermosillo PTA 8/26/2022, 10:09 AM     8/26/2022,4:09 PM

## 2022-08-29 ENCOUNTER — HOSPITAL ENCOUNTER (OUTPATIENT)
Dept: PHYSICAL THERAPY | Age: 62
Setting detail: THERAPIES SERIES
Discharge: HOME | End: 2022-08-29
Payer: MEDICAID

## 2022-08-29 PROCEDURE — 97530 THERAPEUTIC ACTIVITIES: CPT

## 2022-08-29 PROCEDURE — 97112 NEUROMUSCULAR REEDUCATION: CPT

## 2022-08-29 PROCEDURE — 97110 THERAPEUTIC EXERCISES: CPT

## 2022-08-29 NOTE — FLOWSHEET NOTE
Outpatient Physical Therapy  [place]           [x] Phone: [phone]   Fax: [phone]  [place]           [] Phone: [phone]   Fax: [phone]        Physical Therapy Daily Treatment Note  Date:  2022    Patient Name:  Danny Taylor    :  1960  MRN: [id number]  Restrictions/Precautions: No data recorded      Diagnosis:   Displaced comminuted fracture of shaft of left tibia, subsequent encounter for closed fracture with routine healing [S82.252D]    Date of Injury/Surgery: 22 L tibia fx, ORIF 22  Treatment Diagnosis:  L leg pain, decreased strength, gait, balance, motion. Insurance/Certification information:    Referring Physician:  Mike Walker DO     PCP: LISA Carias NP  Next Doctor Visit:  2022  Plan of care signed (Y/N):  n  Outcome Measure: LEFS 2280  Visit# / total visits:    Pain level: Less than 2/10    Goals:     Patient goals: walk without assistive device, decrease pain. Short term goals  Time Frame for Short term goals: 3 weeks  5 deg ankle DF    22 MET 5 deg  4+ or better MMT strength [place]         22 MMT 5/5           Long Term Goals  Time Frame for Long Term Goals: 8 weeks  I in home program.       MET  walk 30 mins without assistive device, minimal pain. NO assistive device: 30mins, mild antalgic, min pain. up/down steps with reciprocal pattern. Performed with some decreased eccentric control. Summary of Evaluation:  Assessment: Pt is s/p tibia fracture 22 with surgical intervention 22. She currently amb with FWW WBAT. She has decreased balance, limited standing/walking tolerance to 10mins, decreased gait/tolerance. Strength and motion are also limited. PT services to improve motion, strength, balance, gait for more independent lifestyle. Subjective:  Doing pretty well. Pain is minimal.  It is usually up at the proximal leg near knee.          Objective: COVID screening questions were asked and patient attested that there had been no contact or symptoms      Ambulated with no AD. Exercises: (No more than 4 columns) Chair lift  Exercise/Equipment 8/19/22 8/24/22 #5 8/26/2022 #6 #7 8/29/22            WARM UP          bike Bike Lv 1 beg Bike Lv 1 beg 5' Bike x 5'     TM incline       TABLE       Ankle AROM       Ankle resistive ex's all planes       Heel slide, knee flexion stretch       bridge       SAQ       SLR        STANDING       Gastroc stretch       Soleus stretch        Heel raise 10x2 10x2  On 1/2 foam roll 10x2  On 1/2 foam roll 2x10   Toe raise 10x2 10 x 2     Step up/downs 6\" 10x2 ant step up 6\" 10x2 ant step up 6\" 10x2 ant step up PRN  UE 6\" step up/over w UE assist.  Up/down 2 flights of stairs w HR, one w step to pattern on w reciprocal pattern   Lateral step up/over 6\" 10x2 lat   step up   UE PRN  Cued for NBOS to avoid increase lateral stress that causes pain. 6\" 10x2 lat   step up 1 UE  6\" 10x2 lat   step up PRN UE 6\" 10x2 lat   step up PRN UE   STS X 10 staggered L back X 10 staggered L back adduction noted  X 10 staggered L back w RTB Staggered step L behind 10x2     Gastroc stretch     30s x 2 30s x 2 1/2 roll 1'  Wedge 45 sec x 2   Gait ambulation mechanics  50 ft x 4 laps no AD  Improved quality with cues. PROPRIOCEPTION       SLS 30 s w UE support 30 s x 2 w 1UE  Unable w/o UE  Cued to avoid Locking knee out  SLS a UE support (required for each LE)   Rocker board A/P, M/L 30\" x2 ea. w/ UE support PRN // bars A/P, M/L 1' ea. w/ UE support PRN in front of treamill bar A/P, M/L 1' ea. w/ UE support PRN in front of treamill bar A/P, M/L 1' ea. w/ UE support  rock and holds   Foam stand, march 10x2 w/ UE support PRN 10x2 w/ UE support PRN 10x2 w/ UE support PRN 10x2 w/ UE support PRN   tandem 30 s  30 s ea.  Floor   30\" ea foot fwd (floor) 30\" ea foot fwd (floor), tandem walk w UE support          MODALITIES        Vaso 10' Vaso 10' Vaso x 10'  declined              Other Therapeutic Activities/Education:        Home Exercise Program:    Access Code: [facility]  URL: [email or web address]  Date: 08/04/2022  Prepared by: Marino Correa    Exercises  Supine Bridge - 1 x daily - 7 x weekly - 3 sets - 10 reps  Supine Heel Slide with Strap - 1 x daily - 7 x weekly - 3 sets - 10 reps  Gastroc Stretch on Wall - 3 x daily - 7 x weekly - 1 sets - 3 reps - 20 hold    8/17/22 SLR, LAQ, SLS, Heel/toe raises, marches, ankle all direction w TB [email or web address] Access Code: [email or web address]    Manual Treatments:  pat mobs, STM to quads, and hamstrings,and some CFM to incision sites. Modalities: Vaso L knee 10'      Communication with other providers:        Assessment:  (Response towards treatment session) (Pain Rating) Pt tolerated treatment well. Goals met, some dec eccentric control descending steps but able to perform. SLS is limited B, affected side matched unaffected side. Pt rated pain at 1/10        Plan for Next Session: Hold 3 weeks, Pt is to call with any lack of progress or worsening of symptoms.          Time In / Time Out:   1116/1156      Timed Code/Total Treatment Minutes: 40/40      Next Progress Note due:        Plan of Care Interventions:  [x] Therapeutic Exercise  [] Modalities:  [x] Therapeutic Activity     [] Ultrasound  [] Estim  [x] Gait Training      [] Cervical Traction [] Lumbar Traction  [x] Neuromuscular Re-education    [] Cold/hotpack [] Iontophoresis   [x] Instruction in HEP      [x] Vasopneumatic   [] Dry Needling    [x] Manual Therapy               [] Aquatic Therapy              Electronically signed by:  Kiara Macias, PT, 8/29/2022, 11:17 AM     8/29/2022,11:17 AM

## 2022-10-20 ENCOUNTER — OFFICE VISIT (OUTPATIENT)
Dept: PULMONOLOGY | Age: 62
End: 2022-10-20
Payer: MEDICAID

## 2022-10-20 VITALS
BODY MASS INDEX: 42.71 KG/M2 | OXYGEN SATURATION: 97 % | SYSTOLIC BLOOD PRESSURE: 100 MMHG | HEIGHT: 61 IN | HEART RATE: 100 BPM | DIASTOLIC BLOOD PRESSURE: 60 MMHG | WEIGHT: 226.2 LBS

## 2022-10-20 DIAGNOSIS — E66.9 OBESITY (BMI 30-39.9): ICD-10-CM

## 2022-10-20 DIAGNOSIS — G47.33 OSA (OBSTRUCTIVE SLEEP APNEA): ICD-10-CM

## 2022-10-20 DIAGNOSIS — G47.10 HYPERSOMNIA: ICD-10-CM

## 2022-10-20 PROCEDURE — G9899 SCRN MAM PERF RSLTS DOC: HCPCS | Performed by: INTERNAL MEDICINE

## 2022-10-20 PROCEDURE — G8484 FLU IMMUNIZE NO ADMIN: HCPCS | Performed by: INTERNAL MEDICINE

## 2022-10-20 PROCEDURE — G8427 DOCREV CUR MEDS BY ELIG CLIN: HCPCS | Performed by: INTERNAL MEDICINE

## 2022-10-20 PROCEDURE — 99213 OFFICE O/P EST LOW 20 MIN: CPT | Performed by: INTERNAL MEDICINE

## 2022-10-20 PROCEDURE — G8417 CALC BMI ABV UP PARAM F/U: HCPCS | Performed by: INTERNAL MEDICINE

## 2022-10-20 ASSESSMENT — ENCOUNTER SYMPTOMS
BACK PAIN: 0
EYE ITCHING: 0
COUGH: 0
EYE DISCHARGE: 0
SHORTNESS OF BREATH: 0
ABDOMINAL PAIN: 0
ABDOMINAL DISTENTION: 0

## 2022-10-20 NOTE — ASSESSMENT & PLAN NOTE
She has mild CANDIS with EDS  She is not able to tolerate the CPAP  She has EDS  Advised BIPAP titration study  Loose weight

## 2022-10-20 NOTE — PROGRESS NOTES
Leni León  1960  Referring Provider: LISA Yancey NP    Subjective:     Chief Complaint   Patient presents with    Sleep Apnea     compliance       HPI  Wyatt Bales is a 63 y.o. female has come back as a follow up. She has mild CANDIS with EDS. She is using it every night about 6 to 7 hours. She says that it is helping her a little . She is still sleepy and tired during the day time. She has a nasal mask. She has gained about 10 lbs since last visit. Her 2 week download data showed a residual AHI of 10.7 and her leak is 11.6 L/min and her 95th percentile pressure is 10.8 cm h20. Current Outpatient Medications   Medication Sig Dispense Refill    aspirin 81 MG EC tablet Take 81 mg by mouth in the morning. donepezil (ARICEPT) 10 MG tablet Take 10 mg by mouth nightly      estradiol (ESTRACE) 0.1 MG/GM vaginal cream Place 2 g vaginally Twice a Week      fluticasone (FLONASE) 50 MCG/ACT nasal spray as needed       SM MUCUS RELIEF 600 MG extended release tablet TAKE 1 TABLET EVERY 12 HOURS AS NEEDED FOR cough AND congestion      hydrOXYzine (ATARAX) 25 MG tablet as needed       indomethacin (INDOCIN) 50 MG capsule Take 1 capsule by mouth 2 times daily as needed for Pain (Headache) 12 capsule 0    ondansetron (ZOFRAN-ODT) 4 MG disintegrating tablet Take 1 tablet by mouth 3 times daily as needed for Nausea or Vomiting (Headache) 21 tablet 0    alendronate (FOSAMAX) 70 MG tablet Take 70 mg by mouth every 7 days       busPIRone (BUSPAR) 30 MG tablet Take 30 mg by mouth 2 times daily       enalapril (VASOTEC) 5 MG tablet Take 5 mg by mouth daily       vitamin B-12 (CYANOCOBALAMIN) 1000 MCG tablet Take 1,000 mcg by mouth daily      OXYBUTYNIN CHLORIDE PO Take 10 mg by mouth daily       cetirizine (ZYRTEC) 10 MG tablet Take 10 mg by mouth daily      levothyroxine (SYNTHROID) 50 MCG tablet Take 50 mcg by mouth Daily      gabapentin (NEURONTIN) 400 MG capsule Take 600 mg by mouth 2 times daily. Cholecalciferol (VITAMIN D3) 5000 UNITS TABS Take by mouth daily       rOPINIRole (REQUIP) 0.5 MG tablet Take 0.5 mg by mouth once. trazodone (DESYREL) 50 MG tablet Take 100 mg by mouth nightly       simvastatin (ZOCOR) 20 MG tablet Take 20 mg by mouth nightly. sertraline (ZOLOFT) 100 MG tablet Take 100 mg by mouth daily       tiZANidine (ZANAFLEX) 2 MG capsule Take 4 mg by mouth at bedtime        No current facility-administered medications for this visit. No Known Allergies    Past Medical History:   Diagnosis Date    Arthritis     osteoarthritis    Degenerative disc disease     Fibromyalgia     Hyperlipidemia     Hypertension     Hypothyroidism 12/16/2019       Past Surgical History:   Procedure Laterality Date    BREAST BIOPSY  2000    BREAST SURGERY      biopsy    HEMORRHOID SURGERY      HYSTERECTOMY (CERVIX STATUS UNKNOWN)      TUBAL LIGATION         Social History     Socioeconomic History    Marital status: Single     Spouse name: None    Number of children: None    Years of education: None    Highest education level: None   Tobacco Use    Smoking status: Never    Smokeless tobacco: Never   Vaping Use    Vaping Use: Never used   Substance and Sexual Activity    Alcohol use: Yes    Drug use: No    Sexual activity: Yes     Partners: Male     Social Determinants of Health     Financial Resource Strain: Low Risk     Difficulty of Paying Living Expenses: Not hard at all   Food Insecurity: No Food Insecurity    Worried About Running Out of Food in the Last Year: Never true    Ran Out of Food in the Last Year: Never true       Review of Systems   Constitutional:  Positive for fatigue. HENT:  Negative for congestion and postnasal drip. Eyes:  Negative for discharge and itching. Respiratory:  Negative for cough and shortness of breath. Cardiovascular:  Negative for chest pain and leg swelling. Gastrointestinal:  Negative for abdominal distention and abdominal pain.    Endocrine: Negative for cold intolerance and heat intolerance. Genitourinary:  Negative for enuresis and frequency. Musculoskeletal:  Negative for arthralgias and back pain. Allergic/Immunologic: Negative for environmental allergies and food allergies. Neurological:  Negative for light-headedness and headaches. Hematological:  Negative for adenopathy. Psychiatric/Behavioral:  Negative for agitation and behavioral problems. Objective:   /60   Pulse 100   Ht 5' 1\" (1.549 m)   SpO2 97%   BMI 39.79 kg/m²   Body mass index is 39.79 kg/m². Sleep Medicine 5/18/2022   Sitting and reading 3   Watching TV 1   Sitting, inactive in a public place (e.g. a theatre or a meeting) 2   As a passenger in a car for an hour without a break 1   Lying down to rest in the afternoon when circumstances permit 3   Sitting and talking to someone 1   Sitting quietly after a lunch without alcohol 1   In a car, while stopped for a few minutes in traffic 0   [place] Sleepiness Score 12     Mallampati 3    Physical Exam  Vitals reviewed. Constitutional:       Appearance: Normal appearance. HENT:      Head: Normocephalic and atraumatic. Nose: Nose normal.      Mouth/Throat:      Mouth: Mucous membranes are moist.   Eyes:      Extraocular Movements: Extraocular movements intact. Pupils: Pupils are equal, round, and reactive to light. Cardiovascular:      Rate and Rhythm: Normal rate and regular rhythm. Pulses: Normal pulses. Heart sounds: Normal heart sounds. Pulmonary:      Effort: Pulmonary effort is normal.      Breath sounds: Normal breath sounds. Abdominal:      General: Abdomen is flat. Palpations: Abdomen is soft. Musculoskeletal:         General: Normal range of motion. Cervical back: Normal range of motion and neck supple. Skin:     General: Skin is warm and dry. Neurological:      General: No focal deficit present.       Mental Status: She is alert and oriented to person, place, and time. Psychiatric:         Mood and Affect: Mood normal.         Behavior: Behavior normal.       Radiology: None    Assessment and Plan     Problem List          Respiratory    CANDIS (obstructive sleep apnea)      She has mild CANDIS with EDS  She is not able to tolerate the CPAP  She has EDS  Advised BIPAP titration study  Loose weight         Relevant Orders    Sleep Study with PAP Titration       Other    Obesity (BMI 30-39. 9)      Advised to loose weight with diet and exercise           Hypersomnia       She has mild CANDIS with EDS  She is not able to tolerate the CPAP  She has EDS  Advised BIPAP titration study  Loose weight                   Follow-Up:    Return in about 4 weeks (around 11/17/2022) for CPAP/BIPAP titration.      Progress notes sent to the referring Provider    Jenae Gonzáles MD MD  10/20/2022  9:54 AM

## 2023-01-10 ENCOUNTER — HOSPITAL ENCOUNTER (OUTPATIENT)
Age: 63
Setting detail: SPECIMEN
Discharge: HOME | End: 2023-01-10

## 2023-01-11 ENCOUNTER — HOSPITAL ENCOUNTER (OUTPATIENT)
Age: 63
Setting detail: SPECIMEN
Discharge: HOME | End: 2023-01-11
Payer: MEDICAID

## 2023-01-11 LAB
CREAT SERPL-MCNC: 1 MG/DL (ref 0.6–1.1)
CREATININE CLEARANCE: 56 ML/MIN (ref 88–128)
CREATININE URINE: 52 MG/DL (ref 28–217)
GFR SERPL CREATININE-BSD FRML MDRD: >60 ML/MIN/1.73M2
HEIGHT, INCHES: ABNORMAL INCHES
Lab: 24 HRS
PATIENT WEIGHT (LBS): ABNORMAL LBS
VOLUME, (UVOL): 1750 MLS

## 2023-01-11 PROCEDURE — 82565 ASSAY OF CREATININE: CPT

## 2023-01-11 PROCEDURE — 82575 CREATININE CLEARANCE TEST: CPT

## 2023-01-17 ENCOUNTER — HOSPITAL ENCOUNTER (OUTPATIENT)
Dept: SLEEP CENTER | Age: 63
Discharge: HOME | End: 2023-01-17
Payer: MEDICAID

## 2023-01-17 DIAGNOSIS — G47.33 OSA (OBSTRUCTIVE SLEEP APNEA): ICD-10-CM

## 2023-01-17 PROCEDURE — 95811 POLYSOM 6/>YRS CPAP 4/> PARM: CPT

## 2023-01-17 ASSESSMENT — SLEEP AND FATIGUE QUESTIONNAIRES
HOW LIKELY ARE YOU TO NOD OFF OR FALL ASLEEP IN A CAR, WHILE STOPPED FOR A FEW MINUTES IN TRAFFIC: 0
HOW LIKELY ARE YOU TO NOD OFF OR FALL ASLEEP WHILE SITTING AND READING: 2
ESS TOTAL SCORE: 11
HOW LIKELY ARE YOU TO NOD OFF OR FALL ASLEEP WHILE WATCHING TV: 2
HOW LIKELY ARE YOU TO NOD OFF OR FALL ASLEEP WHILE SITTING INACTIVE IN A PUBLIC PLACE: 1
HOW LIKELY ARE YOU TO NOD OFF OR FALL ASLEEP WHILE SITTING AND TALKING TO SOMEONE: 0
HOW LIKELY ARE YOU TO NOD OFF OR FALL ASLEEP WHEN YOU ARE A PASSENGER IN A CAR FOR AN HOUR WITHOUT A BREAK: 2
HOW LIKELY ARE YOU TO NOD OFF OR FALL ASLEEP WHILE SITTING QUIETLY AFTER LUNCH WITHOUT ALCOHOL: 1
HOW LIKELY ARE YOU TO NOD OFF OR FALL ASLEEP WHILE LYING DOWN TO REST IN THE AFTERNOON WHEN CIRCUMSTANCES PERMIT: 3

## 2023-01-18 PROCEDURE — 95811 POLYSOM 6/>YRS CPAP 4/> PARM: CPT | Performed by: INTERNAL MEDICINE

## 2023-01-18 NOTE — PROGRESS NOTES
1/17/2023  sleep study  for Dario Gonsalez  1960 is complete. Results are pending physician review.     Electronically signed by Sixto Cheng on 1/17/2023 at 8:24 PM

## 2023-02-28 ENCOUNTER — OFFICE VISIT (OUTPATIENT)
Dept: PULMONOLOGY | Age: 63
End: 2023-02-28
Payer: MEDICAID

## 2023-02-28 VITALS
DIASTOLIC BLOOD PRESSURE: 72 MMHG | SYSTOLIC BLOOD PRESSURE: 128 MMHG | HEIGHT: 61 IN | WEIGHT: 234 LBS | RESPIRATION RATE: 16 BRPM | BODY MASS INDEX: 44.18 KG/M2 | OXYGEN SATURATION: 99 % | HEART RATE: 66 BPM

## 2023-02-28 DIAGNOSIS — G47.31 COMPLEX SLEEP APNEA SYNDROME: ICD-10-CM

## 2023-02-28 DIAGNOSIS — G47.10 HYPERSOMNIA: ICD-10-CM

## 2023-02-28 DIAGNOSIS — E66.9 OBESITY (BMI 30-39.9): ICD-10-CM

## 2023-02-28 PROBLEM — G47.39 COMPLEX SLEEP APNEA SYNDROME: Status: ACTIVE | Noted: 2023-02-28

## 2023-02-28 PROCEDURE — G8417 CALC BMI ABV UP PARAM F/U: HCPCS | Performed by: INTERNAL MEDICINE

## 2023-02-28 PROCEDURE — 3078F DIAST BP <80 MM HG: CPT | Performed by: INTERNAL MEDICINE

## 2023-02-28 PROCEDURE — G8482 FLU IMMUNIZE ORDER/ADMIN: HCPCS | Performed by: INTERNAL MEDICINE

## 2023-02-28 PROCEDURE — 99214 OFFICE O/P EST MOD 30 MIN: CPT | Performed by: INTERNAL MEDICINE

## 2023-02-28 PROCEDURE — G9899 SCRN MAM PERF RSLTS DOC: HCPCS | Performed by: INTERNAL MEDICINE

## 2023-02-28 PROCEDURE — 3074F SYST BP LT 130 MM HG: CPT | Performed by: INTERNAL MEDICINE

## 2023-02-28 PROCEDURE — G8427 DOCREV CUR MEDS BY ELIG CLIN: HCPCS | Performed by: INTERNAL MEDICINE

## 2023-02-28 ASSESSMENT — ENCOUNTER SYMPTOMS
EYE ITCHING: 0
ABDOMINAL DISTENTION: 0
EYE DISCHARGE: 0
COUGH: 0
BACK PAIN: 0
SHORTNESS OF BREATH: 0
ABDOMINAL PAIN: 0

## 2023-02-28 NOTE — PROGRESS NOTES
[place]  1960  Referring Provider: Jt Madrid    Subjective:     Chief Complaint   Patient presents with    Sleep Apnea     Compliance       HPI  Robin Bhat is a 63 y.o. female has come back as a follow up. She has mild CANDIS with EDS. She is on a BIPAP of 13/8 cm h20 which she is using it every night about 7 to 8 hours. She says that it is helping her. She is sleepy during the day time. She has a nasal mask with chin strap. She has no loss of weight. Her 2 week download data showed a residual AHI is 18.3 with 11.9 centrals with a leak of 9.8 L./min.      Current Outpatient Medications   Medication Sig Dispense Refill    omeprazole (PRILOSEC) 40 MG delayed release capsule Take 40 mg by mouth daily      Bisacodyl (LAXATIVE PO) Take 1 tablet by mouth at bedtime      aspirin 325 MG EC tablet Take 325 mg by mouth daily      donepezil (ARICEPT) 10 MG tablet Take 10 mg by mouth nightly      estradiol (ESTRACE) 0.1 MG/GM vaginal cream Place 2 g vaginally Twice a Week      fluticasone (FLONASE) 50 MCG/ACT nasal spray as needed       SM MUCUS RELIEF 600 MG extended release tablet TAKE 1 TABLET EVERY 12 HOURS AS NEEDED FOR cough AND congestion      hydrOXYzine (ATARAX) 25 MG tablet as needed       indomethacin (INDOCIN) 50 MG capsule Take 1 capsule by mouth 2 times daily as needed for Pain (Headache) 12 capsule 0    ondansetron (ZOFRAN-ODT) 4 MG disintegrating tablet Take 1 tablet by mouth 3 times daily as needed for Nausea or Vomiting (Headache) 21 tablet 0    alendronate (FOSAMAX) 70 MG tablet Take 70 mg by mouth every 7 days       busPIRone (BUSPAR) 30 MG tablet Take 30 mg by mouth 2 times daily       enalapril (VASOTEC) 5 MG tablet Take 5 mg by mouth daily       vitamin B-12 (CYANOCOBALAMIN) 1000 MCG tablet Take 1,000 mcg by mouth daily      OXYBUTYNIN CHLORIDE PO Take 10 mg by mouth daily       cetirizine (ZYRTEC) 10 MG tablet Take 10 mg by mouth daily      levothyroxine (SYNTHROID) 50 MCG tablet Take 50 mcg by mouth Daily      gabapentin (NEURONTIN) 400 MG capsule Take 600 mg by mouth 2 times daily. Cholecalciferol (VITAMIN D3) 5000 UNITS TABS Take by mouth daily       rOPINIRole (REQUIP) 0.5 MG tablet Take 0.5 mg by mouth once. trazodone (DESYREL) 50 MG tablet Take 100 mg by mouth nightly       simvastatin (ZOCOR) 20 MG tablet Take 20 mg by mouth nightly. sertraline (ZOLOFT) 100 MG tablet Take 100 mg by mouth daily       tiZANidine (ZANAFLEX) 2 MG capsule Take 4 mg by mouth at bedtime        No current facility-administered medications for this visit. No Known Allergies    Past Medical History:   Diagnosis Date    Arthritis     osteoarthritis    Degenerative disc disease     Fibromyalgia     Hyperlipidemia     Hypertension     Hypothyroidism 12/16/2019       Past Surgical History:   Procedure Laterality Date    BREAST BIOPSY  2000    BREAST SURGERY      biopsy    HEMORRHOID SURGERY      HYSTERECTOMY (CERVIX STATUS UNKNOWN)      TUBAL LIGATION         Social History     Socioeconomic History    Marital status: Single     Spouse name: None    Number of children: None    Years of education: None    Highest education level: None   Tobacco Use    Smoking status: Never    Smokeless tobacco: Never   Vaping Use    Vaping Use: Never used   Substance and Sexual Activity    Alcohol use: Yes    Drug use: No    Sexual activity: Yes     Partners: Male     Social Determinants of Health     Financial Resource Strain: Low Risk     Difficulty of Paying Living Expenses: Not hard at all   Food Insecurity: No Food Insecurity    Worried About Running Out of Food in the Last Year: Never true    Ran Out of Food in the Last Year: Never true       Review of Systems   Constitutional:  Negative for fatigue. HENT:  Negative for congestion and postnasal drip. Eyes:  Negative for discharge and itching. Respiratory:  Negative for cough and shortness of breath.     Cardiovascular:  Negative for chest pain and leg swelling. Gastrointestinal:  Negative for abdominal distention and abdominal pain. Endocrine: Negative for cold intolerance and heat intolerance. Genitourinary:  Negative for enuresis and frequency. Musculoskeletal:  Negative for arthralgias and back pain. Allergic/Immunologic: Negative for environmental allergies and food allergies. Neurological:  Negative for light-headedness and headaches. Hematological:  Negative for adenopathy. Psychiatric/Behavioral:  Negative for agitation and behavioral problems. Objective:   /72   Pulse 66   Resp 16   Ht 5' 1\" (1.549 m)   Wt 234 lb (106.1 kg)   SpO2 99%   BMI 44.21 kg/m²   Body mass index is 44.21 kg/m². Sleep Medicine 1/17/2023 5/18/2022   Sitting and reading 2 3   Watching TV 2 1   Sitting, inactive in a public place (e.g. a theatre or a meeting) 1 2   As a passenger in a car for an hour without a break 2 1   Lying down to rest in the afternoon when circumstances permit 3 3   Sitting and talking to someone 0 1   Sitting quietly after a lunch without alcohol 1 1   In a car, while stopped for a few minutes in traffic 0 0   [place] Sleepiness Score 11 12     Mallampati 3    Physical Exam  Vitals reviewed. Constitutional:       Appearance: Normal appearance. HENT:      Head: Normocephalic and atraumatic. Nose: Nose normal.      Mouth/Throat:      Mouth: Mucous membranes are moist.   Eyes:      Extraocular Movements: Extraocular movements intact. Pupils: Pupils are equal, round, and reactive to light. Cardiovascular:      Rate and Rhythm: Normal rate and regular rhythm. Pulses: Normal pulses. Heart sounds: Normal heart sounds. Pulmonary:      Effort: Pulmonary effort is normal.      Breath sounds: Normal breath sounds. Abdominal:      General: Abdomen is flat. Palpations: Abdomen is soft. Musculoskeletal:         General: Normal range of motion. Cervical back: Normal range of motion and neck supple. Skin:     General: Skin is warm and dry. Neurological:      General: No focal deficit present. Mental Status: She is alert and oriented to person, place, and time. Psychiatric:         Mood and Affect: Mood normal.         Behavior: Behavior normal.       Radiology: None    Assessment and Plan     Problem List          Respiratory    Complex sleep apnea syndrome      Advised to go for the ASV titration study  Loose weight         Relevant Orders    Sleep Study with PAP Titration       Other    Obesity (BMI 30-39. 9)      Advised to loose weight with diet and exercise           Hypersomnia      Advised to go for the ASV titration study  Loose weight                   Follow-Up:    Return in about 4 weeks (around 3/28/2023) for CPAP/BIPAP titration.      Progress notes sent to the referring Provider    Nesha Otero MD MD  2/28/2023  10:27 AM

## 2023-03-30 ENCOUNTER — HOSPITAL ENCOUNTER (OUTPATIENT)
Dept: GENERAL RADIOLOGY | Age: 63
Discharge: HOME | End: 2023-03-30
Payer: MEDICAID

## 2023-03-30 ENCOUNTER — HOSPITAL ENCOUNTER (OUTPATIENT)
Age: 63
Discharge: HOME | End: 2023-03-30
Payer: MEDICAID

## 2023-03-30 DIAGNOSIS — J22 LOWER RESPIRATORY INFECTION: ICD-10-CM

## 2023-03-30 PROCEDURE — 71046 X-RAY EXAM CHEST 2 VIEWS: CPT

## 2023-05-01 ENCOUNTER — HOSPITAL ENCOUNTER (OUTPATIENT)
Dept: SLEEP CENTER | Age: 63
Discharge: HOME | End: 2023-05-01
Payer: MEDICAID

## 2023-05-01 DIAGNOSIS — G47.31 COMPLEX SLEEP APNEA SYNDROME: ICD-10-CM

## 2023-05-01 PROCEDURE — 95811 POLYSOM 6/>YRS CPAP 4/> PARM: CPT

## 2023-05-02 NOTE — PROGRESS NOTES
5/1/2023  sleep study  for Jayson Duron  1960 is complete. Results are pending physician review.     Electronically signed by Marlena White on 5/1/2023 at 8:31 PM

## 2023-05-09 ENCOUNTER — OFFICE VISIT (OUTPATIENT)
Dept: PULMONOLOGY | Age: 63
End: 2023-05-09
Payer: MEDICAID

## 2023-05-09 VITALS
HEIGHT: 61 IN | SYSTOLIC BLOOD PRESSURE: 112 MMHG | WEIGHT: 233.6 LBS | HEART RATE: 68 BPM | BODY MASS INDEX: 44.1 KG/M2 | DIASTOLIC BLOOD PRESSURE: 70 MMHG | OXYGEN SATURATION: 97 %

## 2023-05-09 DIAGNOSIS — G47.31 COMPLEX SLEEP APNEA SYNDROME: ICD-10-CM

## 2023-05-09 DIAGNOSIS — E66.01 MORBID OBESITY WITH BMI OF 40.0-44.9, ADULT (HCC): ICD-10-CM

## 2023-05-09 DIAGNOSIS — G47.10 HYPERSOMNIA: ICD-10-CM

## 2023-05-09 PROCEDURE — 3017F COLORECTAL CA SCREEN DOC REV: CPT | Performed by: INTERNAL MEDICINE

## 2023-05-09 PROCEDURE — 99214 OFFICE O/P EST MOD 30 MIN: CPT | Performed by: INTERNAL MEDICINE

## 2023-05-09 PROCEDURE — G8417 CALC BMI ABV UP PARAM F/U: HCPCS | Performed by: INTERNAL MEDICINE

## 2023-05-09 PROCEDURE — G8427 DOCREV CUR MEDS BY ELIG CLIN: HCPCS | Performed by: INTERNAL MEDICINE

## 2023-05-09 PROCEDURE — 3078F DIAST BP <80 MM HG: CPT | Performed by: INTERNAL MEDICINE

## 2023-05-09 PROCEDURE — 1036F TOBACCO NON-USER: CPT | Performed by: INTERNAL MEDICINE

## 2023-05-09 PROCEDURE — G9899 SCRN MAM PERF RSLTS DOC: HCPCS | Performed by: INTERNAL MEDICINE

## 2023-05-09 PROCEDURE — 3074F SYST BP LT 130 MM HG: CPT | Performed by: INTERNAL MEDICINE

## 2023-05-09 RX ORDER — BUPROPION HYDROCHLORIDE 75 MG/1
TABLET ORAL
COMMUNITY
Start: 2023-04-17

## 2023-05-09 ASSESSMENT — ENCOUNTER SYMPTOMS
ABDOMINAL DISTENTION: 0
EYE DISCHARGE: 0
BACK PAIN: 0
COUGH: 0
ABDOMINAL PAIN: 0
SHORTNESS OF BREATH: 0
EYE ITCHING: 0

## 2023-05-09 NOTE — PROGRESS NOTES
environmental allergies and food allergies. Neurological:  Negative for light-headedness and headaches. Hematological:  Negative for adenopathy. Psychiatric/Behavioral:  Negative for agitation and behavioral problems. Objective:   /70   Pulse 68   Ht 5' 1\" (1.549 m)   Wt 233 lb 9.6 oz (106 kg)   SpO2 97%   BMI 44.14 kg/m²   Body mass index is 44.14 kg/m². Sleep Medicine 1/17/2023 5/18/2022   Sitting and reading 2 3   Watching TV 2 1   Sitting, inactive in a public place (e.g. a theatre or a meeting) 1 2   As a passenger in a car for an hour without a break 2 1   Lying down to rest in the afternoon when circumstances permit 3 3   Sitting and talking to someone 0 1   Sitting quietly after a lunch without alcohol 1 1   In a car, while stopped for a few minutes in traffic 0 0   [place] Sleepiness Score 11 12     Mallampati 3    Physical Exam  Vitals reviewed. Constitutional:       Appearance: Normal appearance. HENT:      Head: Normocephalic and atraumatic. Nose: Nose normal.      Mouth/Throat:      Mouth: Mucous membranes are moist.   Eyes:      Extraocular Movements: Extraocular movements intact. Pupils: Pupils are equal, round, and reactive to light. Cardiovascular:      Rate and Rhythm: Normal rate and regular rhythm. Pulses: Normal pulses. Heart sounds: Normal heart sounds. Pulmonary:      Effort: Pulmonary effort is normal.      Breath sounds: Normal breath sounds. Abdominal:      General: Abdomen is flat. Palpations: Abdomen is soft. Musculoskeletal:         General: Normal range of motion. Cervical back: Normal range of motion and neck supple. Skin:     General: Skin is warm and dry. Neurological:      General: No focal deficit present. Mental Status: She is alert and oriented to person, place, and time.    Psychiatric:         Mood and Affect: Mood normal.         Behavior: Behavior normal.       Radiology: None    Assessment and Plan

## 2023-06-05 ENCOUNTER — OFFICE VISIT (OUTPATIENT)
Dept: OBGYN | Age: 63
End: 2023-06-05
Payer: MEDICAID

## 2023-06-05 VITALS — BODY MASS INDEX: 43.84 KG/M2 | WEIGHT: 232 LBS | DIASTOLIC BLOOD PRESSURE: 66 MMHG | SYSTOLIC BLOOD PRESSURE: 112 MMHG

## 2023-06-05 DIAGNOSIS — Z01.419 ENCOUNTER FOR WELL WOMAN EXAM WITH ROUTINE GYNECOLOGICAL EXAM: Primary | ICD-10-CM

## 2023-06-05 PROCEDURE — 3074F SYST BP LT 130 MM HG: CPT | Performed by: OBSTETRICS & GYNECOLOGY

## 2023-06-05 PROCEDURE — 3078F DIAST BP <80 MM HG: CPT | Performed by: OBSTETRICS & GYNECOLOGY

## 2023-06-05 PROCEDURE — 99396 PREV VISIT EST AGE 40-64: CPT | Performed by: OBSTETRICS & GYNECOLOGY

## 2023-06-05 SDOH — ECONOMIC STABILITY: HOUSING INSECURITY
IN THE LAST 12 MONTHS, WAS THERE A TIME WHEN YOU DID NOT HAVE A STEADY PLACE TO SLEEP OR SLEPT IN A SHELTER (INCLUDING NOW)?: NO

## 2023-06-05 SDOH — ECONOMIC STABILITY: INCOME INSECURITY: HOW HARD IS IT FOR YOU TO PAY FOR THE VERY BASICS LIKE FOOD, HOUSING, MEDICAL CARE, AND HEATING?: NOT HARD AT ALL

## 2023-06-05 SDOH — ECONOMIC STABILITY: FOOD INSECURITY: WITHIN THE PAST 12 MONTHS, THE FOOD YOU BOUGHT JUST DIDN'T LAST AND YOU DIDN'T HAVE MONEY TO GET MORE.: NEVER TRUE

## 2023-06-05 SDOH — ECONOMIC STABILITY: FOOD INSECURITY: WITHIN THE PAST 12 MONTHS, YOU WORRIED THAT YOUR FOOD WOULD RUN OUT BEFORE YOU GOT MONEY TO BUY MORE.: NEVER TRUE

## 2023-06-05 ASSESSMENT — PATIENT HEALTH QUESTIONNAIRE - PHQ9
SUM OF ALL RESPONSES TO PHQ QUESTIONS 1-9: 0
1. LITTLE INTEREST OR PLEASURE IN DOING THINGS: 0
SUM OF ALL RESPONSES TO PHQ9 QUESTIONS 1 & 2: 0
SUM OF ALL RESPONSES TO PHQ QUESTIONS 1-9: 0
2. FEELING DOWN, DEPRESSED OR HOPELESS: 0

## 2023-06-06 ENCOUNTER — HOSPITAL ENCOUNTER (OUTPATIENT)
Dept: PHYSICAL THERAPY | Age: 63
Setting detail: THERAPIES SERIES
Discharge: HOME | End: 2023-06-06
Payer: MEDICAID

## 2023-06-06 PROCEDURE — 97110 THERAPEUTIC EXERCISES: CPT

## 2023-06-06 PROCEDURE — 97161 PT EVAL LOW COMPLEX 20 MIN: CPT

## 2023-06-06 NOTE — PROGRESS NOTES
, [phone] [id number]  Dept: [phone]  Dept Fax: [phone]  Loc: [phone]       POC NOTE

## 2023-06-06 NOTE — FLOWSHEET NOTE
Outpatient Physical Therapy  [place]           [x] Phone: [phone]   Fax: [phone]  [place]           [] Phone: [phone]   Fax: [phone]        Physical Therapy Daily Treatment Note  Date:  2023    Patient Name:  Lupe Whitehead    :  1960  MRN: [id number]  Restrictions/Precautions: n/a  Diagnosis:    Diagnosis: Trochanteric bursitis R hip. Unilateral primary OA R hip. Date of Injury/Surgery:   Treatment Diagnosis:  R hip pain, BLE weakness, pelvic misalignment  Insurance/Certification information: [employer]  27 PCY  Referring Physician:   Edwardo Landrum MD   Next Doctor Visit:    Plan of care signed (Y/N):  sent   Outcome Measure: LEFS: 28  Visit# / total visits:  1 /10  Pain level: 5/10   Goals:     Patient goals: reduce R hip pain  Short term goals  Time Frame for Short term goals: Refer to [facility]                 Long Term Goals  Time Frame for Long Term Goals: 10 visits  Pt will improve R hip AROM flexion to 90 deg or more to aide in STS and more normalized motion BL  Pt will improve R hip strength to 4-/5 to aide in ADLs and gait  Pt will improve LEFS to 37 or more to show MDC and subjective improvement in condition  Pt will present with neutral innominate rotation and resting pelvic position to aide in neutral stance during gait and reduced pain         Summary of Evaluation:  Assessment: Pt is a 29-year-old female who presents to therapy with insidious onset R hip and SIJ pain over the last 6 months. Pt does have PMH of L tibia fx with ORIF late 2022. Upon assessment, pt does present with reduced R hip AROM, R hip pain, BLE (R>L) weakness, impaired gait, pelvic misalignment, R greater trochanteric TTP, and overall reduced independence with ADL and IADL independence secondary to pain.  PT suspects that pt pain is coming from a combination of altered gait since her ORIF last year which has caused pelvic misalignment, R sided weakness and s/s consistent with R trochanteric

## 2023-06-08 ENCOUNTER — HOSPITAL ENCOUNTER (OUTPATIENT)
Dept: PHYSICAL THERAPY | Age: 63
Setting detail: THERAPIES SERIES
Discharge: HOME | End: 2023-06-08
Payer: MEDICAID

## 2023-06-08 PROCEDURE — 97140 MANUAL THERAPY 1/> REGIONS: CPT

## 2023-06-08 PROCEDURE — 97110 THERAPEUTIC EXERCISES: CPT

## 2023-06-08 NOTE — FLOWSHEET NOTE
Outpatient Physical Therapy  [place]           [x] Phone: [phone]   Fax: [phone]  [place]           [] Phone: [phone]   Fax: [phone]        Physical Therapy Daily Treatment Note  Date:  2023    Patient Name:  John Callahan                    :  1960                       MRN: [id number]  Restrictions/Precautions: n/a  Diagnosis:    Diagnosis: Trochanteric bursitis R hip. Unilateral primary OA R hip. Date of Injury/Surgery:   Treatment Diagnosis:  R hip pain, BLE weakness, pelvic misalignment  Insurance/Certification information: [facility] PCY  Referring Physician:   Shraddha Salinas MD   Next Doctor Visit:    Plan of care signed (Y/N):  sent   Outcome Measure: LEFS: 28    Visit# / total visits:  2 /10    Pain level: 4 /10     Goals:     Patient goals: reduce R hip pain  Short term goals  Time Frame for Short term goals: Refer to [facility]  Long Term Goals  Time Frame for Long Term Goals: 10 visits  Pt will improve R hip AROM flexion to 90 deg or more to aide in STS and more normalized motion BL  Pt will improve R hip strength to 4-/5 to aide in ADLs and gait  Pt will improve LEFS to 37 or more to show MDC and subjective improvement in condition  Pt will present with neutral innominate rotation and resting pelvic position to aide in neutral stance during gait and reduced pain      Summary of Evaluation:Assessment: Pt is a 61-year-old female who presents to therapy with insidious onset R hip and SIJ pain over the last 6 months. Pt does have PMH of L tibia fx with ORIF late 2022. Upon assessment, pt does present with reduced R hip AROM, R hip pain, BLE (R>L) weakness, impaired gait, pelvic misalignment, R greater trochanteric TTP, and overall reduced independence with ADL and IADL independence secondary to pain.  PT suspects that pt pain is coming from a combination of altered gait since her ORIF last year which has caused pelvic misalignment, R sided weakness and s/s

## 2023-06-20 ENCOUNTER — HOSPITAL ENCOUNTER (OUTPATIENT)
Dept: PHYSICAL THERAPY | Age: 63
Setting detail: THERAPIES SERIES
Discharge: HOME | End: 2023-06-20
Payer: MEDICAID

## 2023-06-20 ENCOUNTER — OFFICE VISIT (OUTPATIENT)
Dept: PULMONOLOGY | Age: 63
End: 2023-06-20
Payer: MEDICAID

## 2023-06-20 VITALS
BODY MASS INDEX: 43.43 KG/M2 | HEART RATE: 70 BPM | HEIGHT: 61 IN | WEIGHT: 230 LBS | DIASTOLIC BLOOD PRESSURE: 74 MMHG | SYSTOLIC BLOOD PRESSURE: 128 MMHG | OXYGEN SATURATION: 93 % | RESPIRATION RATE: 16 BRPM

## 2023-06-20 DIAGNOSIS — Z01.818 PREOPERATIVE CLEARANCE: ICD-10-CM

## 2023-06-20 DIAGNOSIS — G47.10 HYPERSOMNIA: ICD-10-CM

## 2023-06-20 DIAGNOSIS — G47.31 COMPLEX SLEEP APNEA SYNDROME: ICD-10-CM

## 2023-06-20 DIAGNOSIS — E66.01 MORBID OBESITY WITH BMI OF 40.0-44.9, ADULT (HCC): ICD-10-CM

## 2023-06-20 PROCEDURE — 99214 OFFICE O/P EST MOD 30 MIN: CPT | Performed by: INTERNAL MEDICINE

## 2023-06-20 PROCEDURE — G8417 CALC BMI ABV UP PARAM F/U: HCPCS | Performed by: INTERNAL MEDICINE

## 2023-06-20 PROCEDURE — G8427 DOCREV CUR MEDS BY ELIG CLIN: HCPCS | Performed by: INTERNAL MEDICINE

## 2023-06-20 PROCEDURE — 3078F DIAST BP <80 MM HG: CPT | Performed by: INTERNAL MEDICINE

## 2023-06-20 PROCEDURE — 97110 THERAPEUTIC EXERCISES: CPT

## 2023-06-20 PROCEDURE — G9899 SCRN MAM PERF RSLTS DOC: HCPCS | Performed by: INTERNAL MEDICINE

## 2023-06-20 PROCEDURE — 3017F COLORECTAL CA SCREEN DOC REV: CPT | Performed by: INTERNAL MEDICINE

## 2023-06-20 PROCEDURE — 1036F TOBACCO NON-USER: CPT | Performed by: INTERNAL MEDICINE

## 2023-06-20 PROCEDURE — 3074F SYST BP LT 130 MM HG: CPT | Performed by: INTERNAL MEDICINE

## 2023-06-20 PROCEDURE — 97140 MANUAL THERAPY 1/> REGIONS: CPT

## 2023-06-20 ASSESSMENT — ENCOUNTER SYMPTOMS
BACK PAIN: 0
ABDOMINAL DISTENTION: 0
COUGH: 0
EYE DISCHARGE: 0
EYE ITCHING: 0
SHORTNESS OF BREATH: 0
ABDOMINAL PAIN: 0

## 2023-06-20 NOTE — PROGRESS NOTES
6/20/23    Leon Pham  1960    Chief Complaint   Patient presents with    Annual Exam     pt had hysterectomy about 9 years ago both ovaries removed. last pap 2021 normal. maribel 2022 normal. dexa 2021 osteop. Cologuard normal 2022. pt is taking estradiol cream. no gyn problems. Pt c/o throbbing pain under her right armpit for 1 month.         Leon Pham is a 63 y.o. female who presents today for evaluation of annual examination with complaints as above    Past Medical History:   Diagnosis Date    Arthritis     osteoarthritis    Degenerative disc disease     Fibromyalgia     Hyperlipidemia     Hypertension     Hypothyroidism 12/16/2019       Past Surgical History:   Procedure Laterality Date    BREAST BIOPSY  2000    BREAST SURGERY      biopsy    HEMORRHOID SURGERY      HYSTERECTOMY (CERVIX STATUS UNKNOWN)      TUBAL LIGATION         Social History     Tobacco Use    Smoking status: Never    Smokeless tobacco: Never   Vaping Use    Vaping Use: Never used   Substance Use Topics    Alcohol use: Yes    Drug use: No       Family History   Problem Relation Age of Onset    Heart Disease Mother     High Blood Pressure Mother     Diabetes Father     Heart Disease Brother     Diabetes Brother     High Blood Pressure Brother        Current Outpatient Medications   Medication Sig Dispense Refill    buPROPion (WELLBUTRIN) 75 MG tablet take 1 tablet by oral route every day      omeprazole (PRILOSEC) 40 MG delayed release capsule Take 1 capsule by mouth daily      Bisacodyl (LAXATIVE PO) Take 1 tablet by mouth at bedtime      aspirin 325 MG EC tablet Take 1 tablet by mouth daily      donepezil (ARICEPT) 10 MG tablet Take 1 tablet by mouth nightly      estradiol (ESTRACE) 0.1 MG/GM vaginal cream Place 2 g vaginally Twice a Week      fluticasone (FLONASE) 50 MCG/ACT nasal spray as needed       SM MUCUS RELIEF 600 MG extended release tablet TAKE 1 TABLET EVERY 12 HOURS AS NEEDED FOR cough AND congestion      hydrOXYzine

## 2023-06-20 NOTE — ASSESSMENT & PLAN NOTE
Patient is cleared to go for the Bariatric surgery from Pulmonary side  Loose weight  Incentive Spirometry pre and post OP

## 2023-06-20 NOTE — FLOWSHEET NOTE
Outpatient Physical Therapy  [place]           [x] Phone: [phone]   Fax: [phone]  [facility]           [] Phone: [phone]   Fax: [phone]        Physical Therapy Daily Treatment Note  Date:  2023  Patient Name:  Samuel Barroso                    :  1960                       MRN: [id number]  Restrictions/Precautions: n/a  Diagnosis:    Diagnosis: Trochanteric bursitis R hip. Unilateral primary OA R hip. Date of Injury/Surgery:   Treatment Diagnosis:  R hip pain, BLE weakness, pelvic misalignment  Insurance/Certification information: Amanda Reid - 27 PCY  Referring Physician:   Lauren Torres MD   Next Doctor Visit:    Plan of care signed (Y/N):  sent ; resent   Outcome Measure: LEFS: 28    Visit# / total visits:   4/10    Pain level: 3/10 posterior/lateral hip      Goals:     Patient goals: reduce R hip pain  Short term goals  Time Frame for Short term goals: Refer to [facility]  Long Term Goals  Time Frame for Long Term Goals: 10 visits  Pt will improve R hip AROM flexion to 90 deg or more to aide in STS and more normalized motion BL  Pt will improve R hip strength to 4-/5 to aide in ADLs and gait  Pt will improve LEFS to 37 or more to show MDC and subjective improvement in condition  Pt will present with neutral innominate rotation and resting pelvic position to aide in neutral stance during gait and reduced pain      Summary of Evaluation:Assessment: Pt is a 63-year-old female who presents to therapy with insidious onset R hip and SIJ pain over the last 6 months. Pt does have PMH of L tibia fx with ORIF late 2022. Upon assessment, pt does present with reduced R hip AROM, R hip pain, BLE (R>L) weakness, impaired gait, pelvic misalignment, R greater trochanteric TTP, and overall reduced independence with ADL and IADL independence secondary to pain.  PT suspects that pt pain is coming from a combination of altered gait since her ORIF last year which has caused pelvic

## 2023-06-20 NOTE — PROGRESS NOTES
(ZYRTEC) 10 MG tablet Take 1 tablet by mouth daily      levothyroxine (SYNTHROID) 50 MCG tablet Take 1 tablet by mouth Daily      gabapentin (NEURONTIN) 400 MG capsule Take 600 mg by mouth 2 times daily. Cholecalciferol (VITAMIN D3) 5000 UNITS TABS Take by mouth daily       rOPINIRole (REQUIP) 0.5 MG tablet Take 1 tablet by mouth once      trazodone (DESYREL) 50 MG tablet Take 2 tablets by mouth nightly      simvastatin (ZOCOR) 20 MG tablet Take 1 tablet by mouth nightly      sertraline (ZOLOFT) 100 MG tablet Take 1 tablet by mouth daily      tiZANidine (ZANAFLEX) 2 MG capsule Take 2 capsules by mouth at bedtime       No current facility-administered medications for this visit.        No Known Allergies    Past Medical History:   Diagnosis Date    Arthritis     osteoarthritis    Degenerative disc disease     Fibromyalgia     Hyperlipidemia     Hypertension     Hypothyroidism 12/16/2019       Past Surgical History:   Procedure Laterality Date    BREAST BIOPSY  2000    BREAST SURGERY      biopsy    HEMORRHOID SURGERY      HYSTERECTOMY (CERVIX STATUS UNKNOWN)      TUBAL LIGATION         Social History     Socioeconomic History    Marital status: Single     Spouse name: None    Number of children: None    Years of education: None    Highest education level: None   Tobacco Use    Smoking status: Never    Smokeless tobacco: Never   Vaping Use    Vaping Use: Never used   Substance and Sexual Activity    Alcohol use: Yes    Drug use: No    Sexual activity: Yes     Partners: Male     Social Determinants of Health     Financial Resource Strain: Low Risk     Difficulty of Paying Living Expenses: Not hard at all   Food Insecurity: No Food Insecurity    Worried About Running Out of Food in the Last Year: Never true    Ran Out of Food in the Last Year: Never true   Transportation Needs: Unknown    Lack of Transportation (Non-Medical): No   Housing Stability: Unknown    Unstable Housing in the Last Year: No       Review of

## 2023-06-21 ENCOUNTER — TELEPHONE (OUTPATIENT)
Dept: PULMONOLOGY | Age: 63
End: 2023-06-21

## 2023-06-22 ENCOUNTER — HOSPITAL ENCOUNTER (OUTPATIENT)
Dept: PHYSICAL THERAPY | Age: 63
Setting detail: THERAPIES SERIES
Discharge: HOME | End: 2023-06-22
Payer: MEDICAID

## 2023-06-22 PROCEDURE — 97110 THERAPEUTIC EXERCISES: CPT

## 2023-06-22 PROCEDURE — 97140 MANUAL THERAPY 1/> REGIONS: CPT

## 2023-06-22 NOTE — FLOWSHEET NOTE
Outpatient Physical Therapy  [place]           [x] Phone: [phone]   Fax: [phone]  [place]           [] Phone: [phone]   Fax: [phone]        Physical Therapy Daily Treatment Note  Date:  2023  Patient Name:  Jeffrey Smith                    :  1960                       MRN: [id number]  Restrictions/Precautions: n/a  Diagnosis:    Diagnosis: Trochanteric bursitis R hip. Unilateral primary OA R hip. Date of Injury/Surgery:   Treatment Diagnosis:  R hip pain, BLE weakness, pelvic misalignment  Insurance/Certification information: [facility] - 27 PCY  Referring Physician:   Poly Somers MD   Next Doctor Visit:    Plan of care signed (Y/N):  sent ; resent   Outcome Measure: LEFS: 28    Visit# / total visits:   5/10    Pain level: 6/10 posterior/lateral hip      Goals:     Patient goals: reduce R hip pain  Short term goals  Time Frame for Short term goals: Refer to [facility]  Long Term Goals  Time Frame for Long Term Goals: 10 visits  Pt will improve R hip AROM flexion to 90 deg or more to aide in STS and more normalized motion BL  Pt will improve R hip strength to 4-/5 to aide in ADLs and gait  Pt will improve LEFS to 37 or more to show MDC and subjective improvement in condition  Pt will present with neutral innominate rotation and resting pelvic position to aide in neutral stance during gait and reduced pain      Summary of Evaluation:Assessment: Pt is a 63-year-old female who presents to therapy with insidious onset R hip and SIJ pain over the last 6 months. Pt does have PMH of L tibia fx with ORIF late 2022. Upon assessment, pt does present with reduced R hip AROM, R hip pain, BLE (R>L) weakness, impaired gait, pelvic misalignment, R greater trochanteric TTP, and overall reduced independence with ADL and IADL independence secondary to pain.  PT suspects that pt pain is coming from a combination of altered gait since her ORIF last year which has caused pelvic

## 2023-06-27 ENCOUNTER — HOSPITAL ENCOUNTER (OUTPATIENT)
Dept: PHYSICAL THERAPY | Age: 63
Setting detail: THERAPIES SERIES
Discharge: HOME | End: 2023-06-27
Payer: MEDICAID

## 2023-06-27 PROCEDURE — 97110 THERAPEUTIC EXERCISES: CPT

## 2023-06-27 PROCEDURE — 97530 THERAPEUTIC ACTIVITIES: CPT

## 2023-06-27 PROCEDURE — 97140 MANUAL THERAPY 1/> REGIONS: CPT

## 2023-06-29 ENCOUNTER — HOSPITAL ENCOUNTER (OUTPATIENT)
Dept: PHYSICAL THERAPY | Age: 63
Setting detail: THERAPIES SERIES
Discharge: HOME | End: 2023-06-29
Payer: MEDICAID

## 2023-06-29 PROCEDURE — 97140 MANUAL THERAPY 1/> REGIONS: CPT

## 2023-06-29 PROCEDURE — 97110 THERAPEUTIC EXERCISES: CPT

## 2023-07-06 ENCOUNTER — HOSPITAL ENCOUNTER (OUTPATIENT)
Dept: PHYSICAL THERAPY | Age: 63
Setting detail: THERAPIES SERIES
Discharge: HOME | End: 2023-07-06
Payer: MEDICAID

## 2023-07-06 PROCEDURE — 97530 THERAPEUTIC ACTIVITIES: CPT

## 2023-07-06 PROCEDURE — 97140 MANUAL THERAPY 1/> REGIONS: CPT

## 2023-07-06 PROCEDURE — 97110 THERAPEUTIC EXERCISES: CPT

## 2023-07-06 NOTE — FLOWSHEET NOTE
Activities/Education:  HEP after dc. Self pelvic alignment MET for home if needed after dc    Home Exercise Program: Issued, practiced and pt demo ability to perform 6/6/2023.   6-8-23: glute sets and practice posture with TA bracing. Access Code: [email or web address]  URL: [email or web address]  Date: 06/15/2023  Prepared by: [id number]  - 1 x daily - 7 x weekly - 3 sets - 10 reps - 3 hold  - Supine Hip Adduction Isometric with Ball  - 1 x daily - 7 x weekly - 3 sets - 10 reps - 3 hold  - Seated Hip Adduction Isometrics with Ball  - 1 x daily - 7 x weekly - 3 sets - 10 reps - 3 hold    Manual Treatments:  ITB rolling R side. .    Modalities:  none    Communication with other providers:    Assessment: Pt tolerated today's treatment without any adverse reactions or complications this date. Pt has demo good improved since therapy start with overall improved RLE strength, reduced R hip pain, improved pelvic alignment, and improved flexibility and ROM. She has met all goals at this time and is no longer having major pain. She feels comfortable with HEP after today. PT educated pt on continuation of HEP after discharge for max benefits and reduced risk for future decline.   End pain: same    Plan for Next Session:  R hip strength, step ups, nustep, STS, CHECK PELVIC ALIGNMENT EVERY SESSION    Time In / Time Out:  1032 - 1110    Timed Code/Total Treatment Minutes:  45': 10' MT x1, 10' TA x1, 18' TE x1    Next Progress Note due:  10th visit    Plan of Care Interventions:  [x] Therapeutic Exercise  [x] Modalities:  [x] Therapeutic Activity     [] Ultrasound  [x] Estim  [x] Gait Training      [] Cervical Traction [] Lumbar Traction  [x] Neuromuscular Re-education    [x] Cold/hotpack [] Iontophoresis   [x] Instruction in HEP      [x] Vasopneumatic   [] Dry Needling    [x] Manual Therapy               [] Aquatic Therapy              Electronically signed by:  Della Cornelius, PT, DPT   7/6/2023, 8:06

## 2023-07-20 PROBLEM — Z01.818 PREOPERATIVE CLEARANCE: Status: RESOLVED | Noted: 2023-06-20 | Resolved: 2023-07-20

## 2023-09-22 ENCOUNTER — OFFICE VISIT (OUTPATIENT)
Dept: PULMONOLOGY | Age: 63
End: 2023-09-22
Payer: MEDICARE

## 2023-09-22 VITALS
OXYGEN SATURATION: 95 % | HEART RATE: 61 BPM | WEIGHT: 232 LBS | BODY MASS INDEX: 43.8 KG/M2 | HEIGHT: 61 IN | SYSTOLIC BLOOD PRESSURE: 102 MMHG | DIASTOLIC BLOOD PRESSURE: 68 MMHG

## 2023-09-22 DIAGNOSIS — G47.31 COMPLEX SLEEP APNEA SYNDROME: ICD-10-CM

## 2023-09-22 DIAGNOSIS — E66.01 MORBID OBESITY WITH BMI OF 40.0-44.9, ADULT (HCC): ICD-10-CM

## 2023-09-22 DIAGNOSIS — G47.10 HYPERSOMNIA: ICD-10-CM

## 2023-09-22 PROCEDURE — G8427 DOCREV CUR MEDS BY ELIG CLIN: HCPCS | Performed by: INTERNAL MEDICINE

## 2023-09-22 PROCEDURE — 3017F COLORECTAL CA SCREEN DOC REV: CPT | Performed by: INTERNAL MEDICINE

## 2023-09-22 PROCEDURE — 1036F TOBACCO NON-USER: CPT | Performed by: INTERNAL MEDICINE

## 2023-09-22 PROCEDURE — 3074F SYST BP LT 130 MM HG: CPT | Performed by: INTERNAL MEDICINE

## 2023-09-22 PROCEDURE — G9899 SCRN MAM PERF RSLTS DOC: HCPCS | Performed by: INTERNAL MEDICINE

## 2023-09-22 PROCEDURE — 3078F DIAST BP <80 MM HG: CPT | Performed by: INTERNAL MEDICINE

## 2023-09-22 PROCEDURE — 99214 OFFICE O/P EST MOD 30 MIN: CPT | Performed by: INTERNAL MEDICINE

## 2023-09-22 PROCEDURE — G8417 CALC BMI ABV UP PARAM F/U: HCPCS | Performed by: INTERNAL MEDICINE

## 2023-09-22 ASSESSMENT — ENCOUNTER SYMPTOMS
ABDOMINAL DISTENTION: 0
BACK PAIN: 0
COUGH: 0
EYE ITCHING: 0
SHORTNESS OF BREATH: 0
ABDOMINAL PAIN: 0
EYE DISCHARGE: 0

## 2023-09-22 NOTE — PROGRESS NOTES
reactive to light. Cardiovascular:      Rate and Rhythm: Normal rate and regular rhythm. Pulses: Normal pulses. Heart sounds: Normal heart sounds. Pulmonary:      Effort: Pulmonary effort is normal.      Breath sounds: Normal breath sounds. Abdominal:      General: Abdomen is flat. Palpations: Abdomen is soft. Musculoskeletal:         General: Normal range of motion. Cervical back: Normal range of motion and neck supple. Skin:     General: Skin is warm and dry. Neurological:      General: No focal deficit present. Mental Status: She is alert and oriented to person, place, and time. Psychiatric:         Mood and Affect: Mood normal.         Behavior: Behavior normal.         Radiology: None    Assessment and Plan     Problem List          Respiratory    Complex sleep apnea syndrome      Advised to be compliant with the ASV  Loose weight  Requesting a FFM  Need 2 week download data in 2 months            Other    Hypersomnia      Advised to be compliant with the ASV  Loose weight  Requesting a FFM  Need 2 week download data in 2 months           Morbid obesity with BMI of 40.0-44.9, adult ([address])      Advised to loose weight with diet and exercise                   Total time spent for this encounter: 35 mins    Follow-Up:    Return in about 2 months (around 11/22/2023) for 2 week download data.      Progress notes sent to the referring Provider    Haresh Tilley MD MD  9/22/2023  11:41 AM

## 2023-09-22 NOTE — ASSESSMENT & PLAN NOTE
Advised to be compliant with the ASV  Loose weight  Requesting a FFM  Need 2 week download data in 2 months

## 2024-04-17 ENCOUNTER — HOSPITAL ENCOUNTER (OUTPATIENT)
Age: 64
Discharge: HOME | End: 2024-04-17
Payer: MEDICARE

## 2024-04-17 ENCOUNTER — HOSPITAL ENCOUNTER (OUTPATIENT)
Dept: GENERAL RADIOLOGY | Age: 64
Discharge: HOME | End: 2024-04-17
Payer: MEDICARE

## 2024-04-17 DIAGNOSIS — M54.31 RIGHT SIDED SCIATICA: ICD-10-CM

## 2024-04-17 PROCEDURE — 73502 X-RAY EXAM HIP UNI 2-3 VIEWS: CPT

## 2024-05-01 ENCOUNTER — HOSPITAL ENCOUNTER (OUTPATIENT)
Dept: PHYSICAL THERAPY | Age: 64
Setting detail: THERAPIES SERIES
Discharge: HOME | End: 2024-05-01
Payer: MEDICARE

## 2024-05-01 PROCEDURE — 97110 THERAPEUTIC EXERCISES: CPT

## 2024-05-01 PROCEDURE — 97162 PT EVAL MOD COMPLEX 30 MIN: CPT

## 2024-05-01 ASSESSMENT — PAIN DESCRIPTION - PAIN TYPE: TYPE: CHRONIC PAIN

## 2024-05-01 ASSESSMENT — PAIN DESCRIPTION - LOCATION: LOCATION: BACK;HIP;LEG

## 2024-05-01 ASSESSMENT — PAIN SCALES - GENERAL: PAINLEVEL_OUTOF10: 2

## 2024-05-01 ASSESSMENT — PAIN DESCRIPTION - ORIENTATION: ORIENTATION: RIGHT;OUTER;POSTERIOR

## 2024-05-01 ASSESSMENT — PAIN DESCRIPTION - DESCRIPTORS: DESCRIPTORS: ACHING;DULL;SHARP

## 2024-05-01 NOTE — FLOWSHEET NOTE
Care Interventions:  [x] Therapeutic Exercise  [x] Modalities:  [x] Therapeutic Activity     [] Ultrasound  [x] Estim  [x] Gait Training      [] Cervical Traction [] Lumbar Traction  [x] Neuromuscular Re-education    [x] Cold/hotpack [] Iontophoresis   [x] Instruction in HEP      [] Vasopneumatic   [] Dry Needling    [x] Manual Therapy               [] Aquatic Therapy              Electronically signed by:  Vic Vigil, PT, DPT, OCS  5/1/2024, 12:45 PM    5/1/2024 12:45 PM

## 2024-05-01 NOTE — PROGRESS NOTES
Physical Therapy: Initial Evaluation    Patient: Janet L Zawada (63 y.o. female)   Examination Date: 2024  Plan of Care Certification Period: 2024 to        :  1960 ;    Confirmed: Yes MRN: [id number]  CSN: [id number]   Insurance: Payor: MEDICARE / Plan: MEDICARE PART A AND B / Product Type: *No Product type* /   Insurance ID: [id number] - (Medicare) Secondary Insurance (if applicable): MEDICAID [place]   Referring Physician: Xiomara Del Cid MD     PCP: Xiomara Del Cid MD Visits to Date/Visits Approved:   /      No Show/Cancelled Appts:   /       Medical Diagnosis: Sciatica, right side [M54.31]    Treatment Diagnosis: R paraspinal/hip flexibility restrictions, pain, weakness     PERTINENT MEDICAL HISTORY   Patient Assessed for Rehabilitation Services: Yes  Self reported health status:: Good    Medical History: Chart Reviewed: Yes   Past Medical History:   Diagnosis Date    Arthritis     osteoarthritis    Degenerative disc disease     Fibromyalgia     Hyperlipidemia     Hypertension     Hypothyroidism 2019     Surgical History:   Past Surgical History:   Procedure Laterality Date    BREAST BIOPSY      BREAST SURGERY      biopsy    HEMORRHOID SURGERY      HYSTERECTOMY (CERVIX STATUS UNKNOWN)      TUBAL LIGATION         Medications:   Current Outpatient Medications:     BUPROPION HCL PO, Take 150 mg by mouth daily, Disp: , Rfl:     Bisacodyl (LAXATIVE PO), Take 1 tablet by mouth at bedtime, Disp: , Rfl:     donepezil (ARICEPT) 10 MG tablet, Take 1 tablet by mouth nightly, Disp: , Rfl:     estradiol (ESTRACE) 0.1 MG/GM vaginal cream, Place 2 g vaginally Twice a Week, Disp: , Rfl:     fluticasone (FLONASE) 50 MCG/ACT nasal spray, 1 spray by Nasal route as needed, Disp: , Rfl:     SM MUCUS RELIEF 600 MG extended release tablet, TAKE 1 TABLET EVERY 12 HOURS AS NEEDED FOR cough AND congestion, Disp: , Rfl:     hydrOXYzine (ATARAX) 25 MG tablet, as needed , Disp: , Rfl:     ondansetron

## 2024-05-01 NOTE — PLAN OF CARE
[facility]  [facility]  [address]  [facility] [address]  Dept: [phone]  Dept Fax: [phone]  Loc: [phone]    PHYSICAL THERAPY PLAN OF CARE: INITIAL EVALUATION    Patient: Janet L Zawada (63 y.o. female)   Examination Date: 2024  Plan of Care Certification Period: 2024 to        :  1960  MRN: [id number]  CSN: [id number]   Insurance: Payor: MEDICARE / Plan: MEDICARE PART A AND B / Product Type: *No Product type* /   Insurance ID: [id number] - (Medicare) Secondary Insurance (if applicable): [place]ICAID [place]   Referring Physician: Xiomara Del Cid MD     PCP: Xiomara Del Cid MD Visits to Date/Visits Approved:   /      No Show/Cancelled Appts:   /       Medical Diagnosis: Sciatica, right side [M54.31]    Treatment Diagnosis: R paraspinal/hip flexibility restrictions, pain, weakness       ASSESSMENT     Impression:   Pt is 63 year old female with 3 month episode of R LE radiating pain. Pt now has difficulties completing ADLs, stairs and community activities with increase in symptoms . Pt demo deficits this date that include flexibility restrictions on R side and min R hip weakness.  Pt will benefit with PT services with progression of strength/ROM, manual and modalities to return to PLOF. Pt prior to onset of current condition had min/no pain with able to complete full ADLs and community activities. Patient received education on their current pathology and how their condition effects them with their functional activities. Patient understood discussion and questions were answered. Patient understands their activity limitations and understands rational for treatment progression.      Body Structures, Functions, Activity Limitations Requiring Skilled Therapeutic Intervention: Decreased functional mobility , Decreased strength, Decreased endurance, Increased pain    Statement of Medical Necessity: Physical Therapy is both indicated and

## 2024-07-25 ENCOUNTER — HOSPITAL ENCOUNTER (OUTPATIENT)
Age: 64
Discharge: HOME | End: 2024-07-25
Payer: MEDICARE

## 2024-07-25 DIAGNOSIS — N18.2 CKD STAGE G2/A1, GFR 60-89 AND ALBUMIN CREATININE RATIO <30 MG/G: ICD-10-CM

## 2024-07-25 LAB
ALBUMIN SERPL-MCNC: 4.4 GM/DL (ref 3.4–5)
ALP BLD-CCNC: 128 IU/L (ref 40–128)
ALT SERPL-CCNC: 13 U/L (ref 10–40)
ANION GAP SERPL CALCULATED.3IONS-SCNC: 12 MMOL/L (ref 7–16)
AST SERPL-CCNC: 20 IU/L (ref 15–37)
BACTERIA: NEGATIVE /HPF
BILIRUB SERPL-MCNC: 0.4 MG/DL (ref 0–1)
BILIRUBIN, URINE: NEGATIVE MG/DL
BLOOD, URINE: ABNORMAL
BUN SERPL-MCNC: 12 MG/DL (ref 6–23)
CALCIUM SERPL-MCNC: 9.4 MG/DL (ref 8.3–10.6)
CHLORIDE BLD-SCNC: 105 MMOL/L (ref 99–110)
CLARITY, UA: ABNORMAL
CO2: 24 MMOL/L (ref 21–32)
COLOR, UA: YELLOW
CREAT SERPL-MCNC: 0.9 MG/DL (ref 0.6–1.1)
GFR, ESTIMATED: 73 ML/MIN/1.73M2
GLUCOSE SERPL-MCNC: 82 MG/DL (ref 70–99)
GLUCOSE URINE: NEGATIVE MG/DL
KETONES, URINE: ABNORMAL MG/DL
LEUKOCYTE ESTERASE, URINE: ABNORMAL
MUCUS: ABNORMAL HPF
NITRITE URINE, QUANTITATIVE: POSITIVE
PH, URINE: 7 (ref 5–8)
POTASSIUM SERPL-SCNC: 4.3 MMOL/L (ref 3.5–5.1)
PROTEIN UA: NEGATIVE MG/DL
RBC URINE: 38 /HPF (ref 0–6)
SODIUM BLD-SCNC: 141 MMOL/L (ref 135–145)
SPECIFIC GRAVITY UA: 1.02 (ref 1–1.03)
SQUAMOUS EPITHELIAL: 3 /HPF
TOTAL PROTEIN: 7.4 GM/DL (ref 6.4–8.2)
TRICHOMONAS: ABNORMAL /HPF
UROBILINOGEN, URINE: 1 MG/DL (ref 0.2–1)
WBC UA: 53 /HPF (ref 0–5)

## 2024-07-25 PROCEDURE — 80053 COMPREHEN METABOLIC PANEL: CPT

## 2024-07-25 PROCEDURE — 81001 URINALYSIS AUTO W/SCOPE: CPT

## 2024-07-25 PROCEDURE — 36415 COLL VENOUS BLD VENIPUNCTURE: CPT

## 2024-11-06 ENCOUNTER — HOSPITAL ENCOUNTER (OUTPATIENT)
Dept: WOMENS IMAGING | Age: 64
Discharge: HOME | End: 2024-11-06
Payer: MEDICARE

## 2024-11-06 VITALS — HEIGHT: 61 IN | WEIGHT: 158 LBS | BODY MASS INDEX: 29.83 KG/M2

## 2024-11-06 DIAGNOSIS — Z12.31 ENCOUNTER FOR SCREENING MAMMOGRAM FOR BREAST CANCER: ICD-10-CM

## 2024-11-06 PROCEDURE — 77063 BREAST TOMOSYNTHESIS BI: CPT
